# Patient Record
Sex: MALE | Race: WHITE | NOT HISPANIC OR LATINO | Employment: OTHER | URBAN - METROPOLITAN AREA
[De-identification: names, ages, dates, MRNs, and addresses within clinical notes are randomized per-mention and may not be internally consistent; named-entity substitution may affect disease eponyms.]

---

## 2017-01-03 ENCOUNTER — TELEPHONE (OUTPATIENT)
Dept: FAMILY MEDICINE CLINIC | Facility: CLINIC | Age: 52
End: 2017-01-03

## 2017-01-03 NOTE — TELEPHONE ENCOUNTER
Dr. Vail office called and left a VM stating that they have sent a phyiscal therapy encounter that needs signed and faxed to [phone]. She said this needs done asap. She did not leave a return number

## 2017-01-04 ENCOUNTER — OFFICE VISIT (OUTPATIENT)
Dept: FAMILY MEDICINE CLINIC | Facility: CLINIC | Age: 52
End: 2017-01-04

## 2017-01-04 VITALS — SYSTOLIC BLOOD PRESSURE: 120 MMHG | HEART RATE: 80 BPM | DIASTOLIC BLOOD PRESSURE: 80 MMHG

## 2017-01-04 DIAGNOSIS — R03.0 ELEVATED BLOOD PRESSURE (NOT HYPERTENSION): Primary | ICD-10-CM

## 2017-01-04 PROCEDURE — 99213 OFFICE O/P EST LOW 20 MIN: CPT | Performed by: FAMILY MEDICINE

## 2017-01-04 NOTE — MR AVS SNAPSHOT
Suresh Alcantar   2017 3:30 PM   Office Visit    Provider:  Naresh Gonzalez MD   Department:  [facility] PRIMARY CARE   Dept Phone:  [phone]                Your Full Care Plan              Your Updated Medication List          This list is accurate as of: 17  4:05 PM.  Always use your most recent med list.                baclofen 20 MG tablet   Commonly known as:  LIORESAL   Take 1 tablet by mouth 4 (Four) Times a Day.       loratadine 10 MG tablet   Commonly known as:  CLARITIN   TAKE 1 TABLET BY MOUTH DAILY               You Were Diagnosed With        Codes Comments    Elevated blood pressure (not hypertension)    -  Primary ICD-10-CM: R03.0  ICD-9-CM: 796.2       Instructions    Your blood pressure looks  Great!     Patient Instructions History      "[employer] Signup     [facility] [employer] allows you to send messages to your doctor, view your test results, renew your prescriptions, schedule appointments, and more. To sign up, go to [employer] and click on the Sign Up Now link in the New User? box. Enter your [employer] Activation Code exactly as it appears below along with the last four digits of your Social Security Number and your Date of Birth () to complete the sign-up process. If you do not sign up before the expiration date, you must request a new code.    [employer] Activation Code: [email or web address]  Expires: 2017  5:34 AM    If you have questions, you can email [email or web address] or call [phone] to talk to our [employer] staff. Remember, [employer] is NOT to be used for urgent needs. For medical emergencies, dial 911.               Other Info from Your Visit           Allergies     No Known Allergies      Reason for Visit     Hypertension           Vital Signs     Blood Pressure Pulse Smoking Status             120/80 80 Never Smoker         Problems and Diagnoses Noted     Elevated blood pressure (not hypertension)     -  Primary

## 2017-01-04 NOTE — PROGRESS NOTES
"Subjective   Suresh Alcantar is a 51 y.o. male here for 1MO. re-evaluation for hypertension.    History of Present Illness   At his last visit, he had elevated B/P. This was after a \"roll over\" accident in the waiting room that had upset him.  He is here with a caregiver today and feeling pretty good about life.  The following portions of the patient's history were reviewed and updated as appropriate: allergies, current medications, past medical history, past social history and problem list.    Review of Systems   Constitutional: Negative for activity change, chills, fatigue, fever and unexpected weight change.   HENT: Negative for congestion, sinus pressure, sore throat, tinnitus and trouble swallowing.    Eyes: Negative for discharge and visual disturbance.   Respiratory: Negative for cough, chest tightness, shortness of breath and wheezing.    Cardiovascular: Negative for chest pain, palpitations and leg swelling.   Gastrointestinal: Negative for abdominal distention, abdominal pain, constipation, diarrhea, nausea and vomiting.   Endocrine: Negative for cold intolerance, heat intolerance, polydipsia and polyuria.   Genitourinary: Negative for difficulty urinating, dysuria, frequency and urgency.   Musculoskeletal: Negative for arthralgias, gait problem and myalgias.   Skin: Negative for color change, rash and wound.   Neurological: Negative for dizziness, seizures, syncope, speech difficulty, weakness, light-headedness, numbness and headaches.   Hematological: Does not bruise/bleed easily.   Psychiatric/Behavioral: Negative for agitation, behavioral problems, confusion, hallucinations, self-injury, sleep disturbance and suicidal ideas. The patient is not nervous/anxious.        Objective   Physical Exam   HENT:   Head: Normocephalic and atraumatic.   Cardiovascular: Normal rate and regular rhythm.    Pulmonary/Chest: Effort normal and breath sounds normal.   Neurological: He is alert.   Psychiatric: He has a " normal mood and affect. His behavior is normal.   Nursing note and vitals reviewed.      Assessment/Plan   Suresh was seen today for hypertension.    Diagnoses and all orders for this visit:    Elevated blood pressure (not hypertension)    He has normal blood pressure in the office today I think the last elevated reading was due to an upset in the waiting room.

## 2017-02-23 ENCOUNTER — TELEPHONE (OUTPATIENT)
Dept: FAMILY MEDICINE CLINIC | Facility: CLINIC | Age: 52
End: 2017-02-23

## 2017-02-23 RX ORDER — BACLOFEN 20 MG/1
20 TABLET ORAL 4 TIMES DAILY
Qty: 120 TABLET | Refills: 2 | Status: SHIPPED | OUTPATIENT
Start: 2017-02-23 | End: 2017-03-02 | Stop reason: SDUPTHER

## 2017-02-23 NOTE — TELEPHONE ENCOUNTER
We received a RF request from the pharmacy for Tizanidine 2MG 1 PO BID and Ibuprofen 200MG 1 PO TID PRN. Ok to authorize RF ?

## 2017-02-23 NOTE — TELEPHONE ENCOUNTER
Would you look into this for me? I am fine with the ibuprofen but I do not see Tizanidine on his chart. We have refilled baclofen.

## 2017-03-02 RX ORDER — ACETAMINOPHEN 500 MG
500 TABLET ORAL EVERY 6 HOURS PRN
Qty: 90 TABLET | Refills: 3 | Status: SHIPPED | OUTPATIENT
Start: 2017-03-02

## 2017-03-02 RX ORDER — BACLOFEN 20 MG/1
20 TABLET ORAL 4 TIMES DAILY
Qty: 120 TABLET | Refills: 5 | Status: SHIPPED | OUTPATIENT
Start: 2017-03-02 | End: 2017-08-11 | Stop reason: SDUPTHER

## 2017-03-13 RX ORDER — LORATADINE 10 MG/1
10 TABLET ORAL DAILY
Qty: 90 TABLET | Refills: 0 | Status: SHIPPED | OUTPATIENT
Start: 2017-03-13 | End: 2017-04-21

## 2017-03-29 RX ORDER — HYDROCORTISONE 1 G/100G
CREAM TOPICAL
Qty: 28.35 G | Refills: 5 | Status: SHIPPED | OUTPATIENT
Start: 2017-03-29 | End: 2018-03-21 | Stop reason: SDUPTHER

## 2017-03-29 RX ORDER — IBUPROFEN 200 MG
TABLET ORAL
Qty: 30 TABLET | Refills: 5 | Status: SHIPPED | OUTPATIENT
Start: 2017-03-29 | End: 2018-03-21 | Stop reason: SDUPTHER

## 2017-03-29 RX ORDER — TIZANIDINE 2 MG/1
TABLET ORAL
Qty: 60 TABLET | Refills: 5 | OUTPATIENT
Start: 2017-03-29

## 2017-03-30 RX ORDER — OMEPRAZOLE 40 MG/1
CAPSULE, DELAYED RELEASE ORAL
Qty: 30 CAPSULE | Refills: 5 | Status: SHIPPED | OUTPATIENT
Start: 2017-03-30 | End: 2017-08-11 | Stop reason: SDUPTHER

## 2017-03-30 RX ORDER — MAG/ALUMINUM/SOD BICARB/ALGINC 20 MG-80MG
TABLET,CHEWABLE ORAL
Qty: 30 TABLET | Refills: 5 | Status: SHIPPED | OUTPATIENT
Start: 2017-03-30 | End: 2018-03-21 | Stop reason: SDUPTHER

## 2017-03-30 RX ORDER — TIZANIDINE 2 MG/1
TABLET ORAL
Qty: 60 TABLET | OUTPATIENT
Start: 2017-03-30

## 2017-03-30 RX ORDER — GUAIFENESIN 200 MG/1
TABLET ORAL
Qty: 30 TABLET | Refills: 5 | Status: SHIPPED | OUTPATIENT
Start: 2017-03-30 | End: 2019-01-15 | Stop reason: SDUPTHER

## 2017-03-30 RX ORDER — OCTISALATE, AVOBENZONE, HOMOSALATE, AND OCTOCRYLENE 29.4; 29.4; 49; 25.48 MG/ML; MG/ML; MG/ML; MG/ML
LOTION TOPICAL
Qty: 30 CAPSULE | Refills: 5 | Status: SHIPPED | OUTPATIENT
Start: 2017-03-30 | End: 2017-04-14 | Stop reason: SDUPTHER

## 2017-04-13 ENCOUNTER — TELEPHONE (OUTPATIENT)
Dept: FAMILY MEDICINE CLINIC | Facility: CLINIC | Age: 52
End: 2017-04-13

## 2017-04-13 NOTE — TELEPHONE ENCOUNTER
Carlos A's sister, Marsha, left a VM requesting a pick-up for urine cups to be checked for a UTI. This was recommended by therapy. Can we authorize Marsha to pick-up a urine cup to send off for culture ? Please advise.

## 2017-04-14 ENCOUNTER — CLINICAL SUPPORT (OUTPATIENT)
Dept: FAMILY MEDICINE CLINIC | Facility: CLINIC | Age: 52
End: 2017-04-14

## 2017-04-14 DIAGNOSIS — R80.9 PROTEINURIA: Primary | ICD-10-CM

## 2017-04-14 LAB
BILIRUB BLD-MCNC: NEGATIVE MG/DL
CLARITY, POC: CLEAR
COLOR UR: YELLOW
GLUCOSE UR STRIP-MCNC: NEGATIVE MG/DL
KETONES UR QL: NEGATIVE
LEUKOCYTE EST, POC: NEGATIVE
NITRITE UR-MCNC: NEGATIVE MG/ML
PH UR: 7.5 [PH] (ref 5–8)
PROT UR STRIP-MCNC: ABNORMAL MG/DL
RBC # UR STRIP: NEGATIVE /UL
SP GR UR: 1 (ref 1–1.03)
UROBILINOGEN UR QL: NORMAL

## 2017-04-14 PROCEDURE — 81002 URINALYSIS NONAUTO W/O SCOPE: CPT | Performed by: FAMILY MEDICINE

## 2017-04-14 RX ORDER — TIZANIDINE 2 MG/1
TABLET ORAL
Qty: 180 TABLET | Refills: 0 | Status: SHIPPED | OUTPATIENT
Start: 2017-04-14 | End: 2017-07-12 | Stop reason: SDUPTHER

## 2017-04-14 RX ORDER — OCTISALATE, AVOBENZONE, HOMOSALATE, AND OCTOCRYLENE 29.4; 29.4; 49; 25.48 MG/ML; MG/ML; MG/ML; MG/ML
LOTION TOPICAL
Qty: 90 CAPSULE | Refills: 0 | Status: SHIPPED | OUTPATIENT
Start: 2017-04-14 | End: 2017-07-12 | Stop reason: SDUPTHER

## 2017-04-16 LAB
BACTERIA UR CULT: NORMAL
BACTERIA UR CULT: NORMAL

## 2017-04-21 ENCOUNTER — TELEPHONE (OUTPATIENT)
Dept: FAMILY MEDICINE CLINIC | Facility: CLINIC | Age: 52
End: 2017-04-21

## 2017-04-21 RX ORDER — CETIRIZINE HYDROCHLORIDE 10 MG/1
10 TABLET ORAL DAILY
Qty: 30 TABLET | Refills: 5 | Status: SHIPPED | OUTPATIENT
Start: 2017-04-21 | End: 2017-09-19 | Stop reason: SDUPTHER

## 2017-04-21 NOTE — TELEPHONE ENCOUNTER
Sister called and would like Bryce allergy med from Claritin to Zyrtec.  New Rx sent to [facility] and DC order sent as well

## 2017-05-16 RX ORDER — DOCOSANOL 100 MG/G
CREAM TOPICAL
Qty: 1 TUBE | Refills: 3 | Status: SHIPPED | OUTPATIENT
Start: 2017-05-16 | End: 2019-05-09

## 2017-06-14 RX ORDER — DOCUSATE SODIUM 100 MG/1
CAPSULE, LIQUID FILLED ORAL
Qty: 30 CAPSULE | Refills: 5 | Status: SHIPPED | OUTPATIENT
Start: 2017-06-14 | End: 2017-12-05 | Stop reason: SDUPTHER

## 2017-07-12 RX ORDER — TIZANIDINE 2 MG/1
TABLET ORAL
Qty: 180 TABLET | Refills: 0 | Status: SHIPPED | OUTPATIENT
Start: 2017-07-12 | End: 2017-10-12 | Stop reason: SDUPTHER

## 2017-07-12 RX ORDER — OCTISALATE, AVOBENZONE, HOMOSALATE, AND OCTOCRYLENE 29.4; 29.4; 49; 25.48 MG/ML; MG/ML; MG/ML; MG/ML
LOTION TOPICAL
Qty: 90 CAPSULE | Refills: 0 | Status: SHIPPED | OUTPATIENT
Start: 2017-07-12 | End: 2017-10-12 | Stop reason: SDUPTHER

## 2017-08-14 RX ORDER — MULTIVIT-MIN/FOLIC/VIT K/LYCOP 400-300MCG
TABLET ORAL
Qty: 30 TABLET | Refills: 12 | Status: SHIPPED | OUTPATIENT
Start: 2017-08-14 | End: 2018-08-07 | Stop reason: SDUPTHER

## 2017-08-14 RX ORDER — OMEPRAZOLE 40 MG/1
CAPSULE, DELAYED RELEASE ORAL
Qty: 30 CAPSULE | Refills: 12 | Status: SHIPPED | OUTPATIENT
Start: 2017-08-14 | End: 2018-01-19 | Stop reason: SDUPTHER

## 2017-08-14 RX ORDER — BACLOFEN 20 MG/1
TABLET ORAL
Qty: 120 TABLET | Refills: 1 | Status: SHIPPED | OUTPATIENT
Start: 2017-08-14 | End: 2017-10-12 | Stop reason: SDUPTHER

## 2017-09-19 RX ORDER — CETIRIZINE HYDROCHLORIDE 10 MG/1
TABLET ORAL
Qty: 30 TABLET | Refills: 5 | Status: SHIPPED | OUTPATIENT
Start: 2017-09-19 | End: 2018-03-21 | Stop reason: SDUPTHER

## 2017-10-13 RX ORDER — OCTISALATE, AVOBENZONE, HOMOSALATE, AND OCTOCRYLENE 29.4; 29.4; 49; 25.48 MG/ML; MG/ML; MG/ML; MG/ML
LOTION TOPICAL
Qty: 90 CAPSULE | Refills: 3 | Status: SHIPPED | OUTPATIENT
Start: 2017-10-13 | End: 2018-09-25 | Stop reason: SDUPTHER

## 2017-10-13 RX ORDER — BACLOFEN 20 MG/1
TABLET ORAL
Qty: 120 TABLET | Refills: 3 | Status: SHIPPED | OUTPATIENT
Start: 2017-10-13 | End: 2018-02-06 | Stop reason: SDUPTHER

## 2017-10-13 RX ORDER — TIZANIDINE 2 MG/1
TABLET ORAL
Qty: 180 TABLET | Refills: 3 | Status: SHIPPED | OUTPATIENT
Start: 2017-10-13 | End: 2018-09-25 | Stop reason: SDUPTHER

## 2017-12-05 RX ORDER — DOCUSATE SODIUM 100 MG/1
CAPSULE ORAL
Qty: 30 CAPSULE | Refills: 11 | Status: SHIPPED | OUTPATIENT
Start: 2017-12-05 | End: 2018-11-26 | Stop reason: SDUPTHER

## 2017-12-13 ENCOUNTER — OFFICE VISIT (OUTPATIENT)
Dept: FAMILY MEDICINE CLINIC | Facility: CLINIC | Age: 52
End: 2017-12-13

## 2017-12-13 VITALS
HEART RATE: 91 BPM | SYSTOLIC BLOOD PRESSURE: 126 MMHG | DIASTOLIC BLOOD PRESSURE: 86 MMHG | OXYGEN SATURATION: 98 % | TEMPERATURE: 98.4 F

## 2017-12-13 DIAGNOSIS — J06.9 ACUTE URI: Primary | ICD-10-CM

## 2017-12-13 PROCEDURE — 99213 OFFICE O/P EST LOW 20 MIN: CPT | Performed by: FAMILY MEDICINE

## 2017-12-13 NOTE — PROGRESS NOTES
Subjective   Suresh Alcantar is a 52 y.o. male.     History of Present Illness   Suresh is here today with a cough and runny nose. The symptoms started on Friday, but he didn't start taking any Mucinex until Monday.     The following portions of the patient's history were reviewed and updated as appropriate: allergies, current medications, past medical history, past social history and problem list.    Review of Systems   Genitourinary: Positive for frequency.   All other systems reviewed and are negative.      Objective   Physical Exam   Constitutional: He is oriented to person, place, and time. He appears well-developed and well-nourished. No distress.   HENT:   Head: Normocephalic and atraumatic.   Right Ear: External ear normal.   Left Ear: External ear normal.   Nose: Nose normal.   Mouth/Throat: Oropharynx is clear and moist. No oropharyngeal exudate.   Eyes: Conjunctivae and EOM are normal. Pupils are equal, round, and reactive to light.   Neck: Normal range of motion.   Cardiovascular: Normal rate and regular rhythm.    Pulmonary/Chest: Effort normal and breath sounds normal. No stridor. No respiratory distress. He has no wheezes.   Lymphadenopathy:     He has no cervical adenopathy.   Neurological: He is alert and oriented to person, place, and time.   Skin: Skin is warm and dry. No rash noted.   Nursing note and vitals reviewed.      Assessment/Plan   Suresh was seen today for nasal congestion and sore throat.    Diagnoses and all orders for this visit:    Acute URI    Patient Instructions   I have advised supportive care with Mucinex and lots of fluids, Tylenol as needed. Caregiver will call if he does not improve or gets worse.

## 2017-12-15 ENCOUNTER — CLINICAL SUPPORT (OUTPATIENT)
Dept: FAMILY MEDICINE CLINIC | Facility: CLINIC | Age: 52
End: 2017-12-15

## 2017-12-15 DIAGNOSIS — R35.0 URINARY FREQUENCY: Primary | ICD-10-CM

## 2017-12-15 DIAGNOSIS — R31.9 HEMATURIA, UNSPECIFIED TYPE: ICD-10-CM

## 2017-12-15 DIAGNOSIS — R31.9 HEMATURIA, UNSPECIFIED TYPE: Primary | ICD-10-CM

## 2017-12-15 LAB
BILIRUB BLD-MCNC: NEGATIVE MG/DL
CLARITY, POC: CLEAR
COLOR UR: ABNORMAL
GLUCOSE UR STRIP-MCNC: NEGATIVE MG/DL
KETONES UR QL: NEGATIVE
LEUKOCYTE EST, POC: NEGATIVE
NITRITE UR-MCNC: NEGATIVE MG/ML
PH UR: 9 [PH] (ref 5–8)
PROT UR STRIP-MCNC: ABNORMAL MG/DL
RBC # UR STRIP: ABNORMAL /UL
SP GR UR: 1 (ref 1–1.03)
UROBILINOGEN UR QL: NORMAL

## 2017-12-15 PROCEDURE — 81002 URINALYSIS NONAUTO W/O SCOPE: CPT | Performed by: FAMILY MEDICINE

## 2017-12-15 RX ORDER — SULFAMETHOXAZOLE AND TRIMETHOPRIM 800; 160 MG/1; MG/1
1 TABLET ORAL 2 TIMES DAILY
Qty: 6 TABLET | Refills: 0 | Status: SHIPPED | OUTPATIENT
Start: 2017-12-15 | End: 2019-05-09

## 2017-12-16 NOTE — PATIENT INSTRUCTIONS
I have advised supportive care with Mucinex and lots of fluids, Tylenol as needed. Caregiver will call if he does not improve or gets worse.

## 2017-12-21 LAB
BACTERIA UR CULT: ABNORMAL
BACTERIA UR CULT: ABNORMAL
OTHER ANTIBIOTIC SUSC ISLT: ABNORMAL

## 2018-01-15 DIAGNOSIS — R31.9 HEMATURIA, UNSPECIFIED TYPE: Primary | ICD-10-CM

## 2018-01-15 LAB
APPEARANCE UR: CLEAR
BACTERIA #/AREA URNS HPF: NORMAL /HPF
BILIRUB UR QL STRIP: NEGATIVE
COLOR UR: YELLOW
EPI CELLS #/AREA URNS HPF: NORMAL /HPF
GLUCOSE UR QL: (no result)
HGB UR QL STRIP: NEGATIVE
KETONES UR QL STRIP: NEGATIVE
LEUKOCYTE ESTERASE UR QL STRIP: NEGATIVE
NITRITE UR QL STRIP: NEGATIVE
PH UR STRIP: 7 [PH] (ref 5–8)
PROT UR QL STRIP: NEGATIVE
RBC #/AREA URNS HPF: NORMAL /HPF
SP GR UR: 1.01 (ref 1–1.03)
UROBILINOGEN UR STRIP-MCNC: (no result) MG/DL
WBC #/AREA URNS HPF: NORMAL /HPF

## 2018-01-17 LAB
BACTERIA UR CULT: NORMAL
BACTERIA UR CULT: NORMAL

## 2018-01-19 RX ORDER — OMEPRAZOLE 40 MG/1
CAPSULE, DELAYED RELEASE ORAL
Qty: 30 CAPSULE | Refills: 5 | Status: SHIPPED | OUTPATIENT
Start: 2018-01-19 | End: 2018-08-07 | Stop reason: SDUPTHER

## 2018-02-06 RX ORDER — BACLOFEN 20 MG/1
TABLET ORAL
Qty: 120 TABLET | Refills: 3 | Status: SHIPPED | OUTPATIENT
Start: 2018-02-06 | End: 2018-06-01 | Stop reason: SDUPTHER

## 2018-03-21 RX ORDER — IBUPROFEN 200 MG
TABLET ORAL
Qty: 30 TABLET | Refills: 6 | Status: SHIPPED | OUTPATIENT
Start: 2018-03-21 | End: 2021-02-04 | Stop reason: SDUPTHER

## 2018-03-21 RX ORDER — HYDROCORTISONE 1 G/100G
CREAM TOPICAL
Qty: 28.4 G | Refills: 6 | Status: SHIPPED | OUTPATIENT
Start: 2018-03-21 | End: 2019-03-15 | Stop reason: SDUPTHER

## 2018-03-21 RX ORDER — MAG/ALUMINUM/SOD BICARB/ALGINC 20 MG-80MG
TABLET,CHEWABLE ORAL
Qty: 30 TABLET | Refills: 6 | Status: SHIPPED | OUTPATIENT
Start: 2018-03-21 | End: 2019-03-15 | Stop reason: SDUPTHER

## 2018-03-21 RX ORDER — CETIRIZINE HYDROCHLORIDE 10 MG/1
TABLET ORAL
Qty: 30 TABLET | Refills: 6 | Status: SHIPPED | OUTPATIENT
Start: 2018-03-21 | End: 2018-10-19 | Stop reason: SDUPTHER

## 2018-03-26 ENCOUNTER — OFFICE VISIT (OUTPATIENT)
Dept: FAMILY MEDICINE CLINIC | Facility: CLINIC | Age: 53
End: 2018-03-26

## 2018-03-26 VITALS — HEART RATE: 154 BPM | RESPIRATION RATE: 16 BRPM | DIASTOLIC BLOOD PRESSURE: 88 MMHG | SYSTOLIC BLOOD PRESSURE: 128 MMHG

## 2018-03-26 DIAGNOSIS — Z12.5 SCREENING FOR PROSTATE CANCER: ICD-10-CM

## 2018-03-26 DIAGNOSIS — Z00.00 PHYSICAL EXAM: Primary | ICD-10-CM

## 2018-03-26 DIAGNOSIS — G80.1 SPASTIC DIPLEGIC CEREBRAL PALSY (HCC): ICD-10-CM

## 2018-03-26 PROCEDURE — 99396 PREV VISIT EST AGE 40-64: CPT | Performed by: NURSE PRACTITIONER

## 2018-03-26 NOTE — PROGRESS NOTES
Suresh Alcantar is a 53 y.o. male. He is accompanied by a caretaker from [facility] .  Here for his annual wellness exam for [facility]  Is doing well has no complaints.  H/O CP  H/O constipation and seasonal allergies and spasticity, is stable on his meds  Has daily PT, is wheel chair bound is no longer ambulatory      Assessment/Plan   Problem List Items Addressed This Visit     None      Visit Diagnoses     Physical exam    -  Primary    Relevant Orders    Comprehensive Metabolic Panel (Completed)    Lipid Panel With / Chol / HDL Ratio (Completed)    QuantiFERON TB Gold    PSA SCREENING    Screening for prostate cancer        Relevant Orders    PSA SCREENING    Spastic diplegic cerebral palsy          No problems or complaints       No Follow-up on file.  There are no Patient Instructions on file for this visit.    Chief Complaint   Patient presents with   • Annual Exam     Medicare     Social History   Substance Use Topics   • Smoking status: Never Smoker   • Smokeless tobacco: Never Used   • Alcohol use No       History of Present Illness     The following portions of the patient's history were reviewed and updated as appropriate:PMHroutine: Social history , Allergies, Current Medications, Active Problem List and Health Maintenance    Review of Systems   Constitutional: Negative for activity change and appetite change.   Respiratory: Negative for cough and shortness of breath.        Objective   Vitals:    03/26/18 1447   BP: 128/88   Pulse: (!) 154   Resp: 16     There is no height or weight on file to calculate BMI.  Physical Exam   Constitutional: He appears well-developed and well-nourished. No distress.   HENT:   Head: Normocephalic and atraumatic.   Right Ear: External ear normal.   Left Ear: External ear normal.   Mouth/Throat: Oropharynx is clear and moist.   Eyes: EOM are normal.   Neck: Neck supple.   Cardiovascular: Normal rate and regular rhythm.    Pulmonary/Chest: Effort normal and breath sounds  normal.   Musculoskeletal: Normal range of motion.   Spacticity upper and lower extremities.   Neurological: He is alert.   Skin: Skin is warm.   Nursing note and vitals reviewed.    Reviewed Data:  Office Visit on 03/26/2018   Component Date Value Ref Range Status   • Glucose 03/27/2018 104* 65 - 99 mg/dL Final   • BUN 03/27/2018 13  6 - 24 mg/dL Final   • Creatinine 03/27/2018 0.67* 0.76 - 1.27 mg/dL Final   • eGFR Non African Am 03/27/2018 110  >59 mL/min/1.73 Final   • eGFR African Am 03/27/2018 127  >59 mL/min/1.73 Final   • BUN/Creatinine Ratio 03/27/2018 19  9 - 20 Final   • Sodium 03/27/2018 143  134 - 144 mmol/L Final   • Potassium 03/27/2018 4.8  3.5 - 5.2 mmol/L Final   • Chloride 03/27/2018 101  96 - 106 mmol/L Final   • Total CO2 03/27/2018 27  18 - 29 mmol/L Final   • Calcium 03/27/2018 9.4  8.7 - 10.2 mg/dL Final   • Total Protein 03/27/2018 7.3  6.0 - 8.5 g/dL Final   • Albumin 03/27/2018 4.4  3.5 - 5.5 g/dL Final   • Globulin 03/27/2018 2.9  1.5 - 4.5 g/dL Final   • A/G Ratio 03/27/2018 1.5  1.2 - 2.2 Final   • Total Bilirubin 03/27/2018 0.2  0.0 - 1.2 mg/dL Final   • Alkaline Phosphatase 03/27/2018 81  39 - 117 IU/L Final   • AST (SGOT) 03/27/2018 20  0 - 40 IU/L Final   • ALT (SGPT) 03/27/2018 11  0 - 44 IU/L Final   • Total Cholesterol 03/27/2018 155  100 - 199 mg/dL Final   • Triglycerides 03/27/2018 44  0 - 149 mg/dL Final   • HDL Cholesterol 03/27/2018 66  >39 mg/dL Final   • VLDL Cholesterol 03/27/2018 9  5 - 40 mg/dL Final   • LDL Cholesterol  03/27/2018 80  0 - 99 mg/dL Final   • Chol/HDL Ratio 03/27/2018 2.3  0.0 - 5.0 ratio units Final    Comment:                                   T. Chol/HDL Ratio                                              Men  Women                                1/2 Avg.Risk  3.4    3.3                                    Avg.Risk  5.0    4.4                                 2X Avg.Risk  9.6    7.1                                 3X Avg.Risk 23.4   11.0     • PSA  03/27/2018 1.4  0.0 - 4.0 ng/mL Final    Comment: Roche ECLIA methodology.  According to the American Urological Association, Serum PSA should  decrease and remain at undetectable levels after radical  prostatectomy. The AUA defines biochemical recurrence as an initial  PSA value 0.2 ng/mL or greater followed by a subsequent confirmatory  PSA value 0.2 ng/mL or greater.  Values obtained with different assay methods or kits cannot be used  interchangeably. Results cannot be interpreted as absolute evidence  of the presence or absence of malignant disease.

## 2018-03-27 PROBLEM — G80.1 SPASTIC DIPLEGIC CEREBRAL PALSY: Chronic | Status: ACTIVE | Noted: 2018-03-27

## 2018-03-27 LAB
ALBUMIN SERPL-MCNC: 4.4 G/DL (ref 3.5–5.5)
ALBUMIN/GLOB SERPL: 1.5 {RATIO} (ref 1.2–2.2)
ALP SERPL-CCNC: 81 IU/L (ref 39–117)
ALT SERPL-CCNC: 11 IU/L (ref 0–44)
AST SERPL-CCNC: 20 IU/L (ref 0–40)
BILIRUB SERPL-MCNC: 0.2 MG/DL (ref 0–1.2)
BUN SERPL-MCNC: 13 MG/DL (ref 6–24)
BUN/CREAT SERPL: 19 (ref 9–20)
CALCIUM SERPL-MCNC: 9.4 MG/DL (ref 8.7–10.2)
CHLORIDE SERPL-SCNC: 101 MMOL/L (ref 96–106)
CHOLEST SERPL-MCNC: 155 MG/DL (ref 100–199)
CHOLEST/HDLC SERPL: 2.3 RATIO UNITS (ref 0–5)
CO2 SERPL-SCNC: 27 MMOL/L (ref 18–29)
CREAT SERPL-MCNC: 0.67 MG/DL (ref 0.76–1.27)
GFR SERPLBLD CREATININE-BSD FMLA CKD-EPI: 110 ML/MIN/1.73
GFR SERPLBLD CREATININE-BSD FMLA CKD-EPI: 127 ML/MIN/1.73
GLOBULIN SER CALC-MCNC: 2.9 G/DL (ref 1.5–4.5)
GLUCOSE SERPL-MCNC: 104 MG/DL (ref 65–99)
HDLC SERPL-MCNC: 66 MG/DL
LDLC SERPL CALC-MCNC: 80 MG/DL (ref 0–99)
POTASSIUM SERPL-SCNC: 4.8 MMOL/L (ref 3.5–5.2)
PROT SERPL-MCNC: 7.3 G/DL (ref 6–8.5)
PSA SERPL-MCNC: 1.4 NG/ML (ref 0–4)
SODIUM SERPL-SCNC: 143 MMOL/L (ref 134–144)
TRIGL SERPL-MCNC: 44 MG/DL (ref 0–149)
VLDLC SERPL CALC-MCNC: 9 MG/DL (ref 5–40)

## 2018-03-29 LAB
ANNOTATION COMMENT IMP: NORMAL
GAMMA INTERFERON BACKGROUND BLD IA-ACNC: 0.02 IU/ML
M TB IFN-G BLD-IMP: NEGATIVE
M TB IFN-G CD4+ BCKGRND COR BLD-ACNC: 0.06 IU/ML
M TB IFN-G CD4+ T-CELLS BLD-ACNC: 0.08 IU/ML
MITOGEN IGNF BLD-ACNC: >10 IU/ML
QUANTIFERON INCUBATION: NORMAL
SERVICE CMNT-IMP: NORMAL

## 2018-06-01 RX ORDER — BACLOFEN 20 MG/1
TABLET ORAL
Qty: 120 TABLET | Refills: 5 | Status: SHIPPED | OUTPATIENT
Start: 2018-06-01 | End: 2018-07-17 | Stop reason: SDUPTHER

## 2018-06-13 ENCOUNTER — TELEPHONE (OUTPATIENT)
Dept: FAMILY MEDICINE CLINIC | Facility: CLINIC | Age: 53
End: 2018-06-13

## 2018-06-13 DIAGNOSIS — R29.6 FREQUENT FALLS: Primary | ICD-10-CM

## 2018-06-13 DIAGNOSIS — G80.0 SPASTIC QUADRIPLEGIC CEREBRAL PALSY (HCC): ICD-10-CM

## 2018-06-13 DIAGNOSIS — R29.6 FALLS FREQUENTLY: Primary | ICD-10-CM

## 2018-06-13 NOTE — TELEPHONE ENCOUNTER
He is with [facility] and Marsha called to say toilet in his apartment is messed up and she complained when he first moved in, but [facility] asked her to work with them.    He has fallen twice and [facility] is now requesting PT/OT to eval him to be sure it's not just him falling before they put in order to have toilet repaired.    Says she spoke with Erica at [facility] and she prefers for her to work with him.    Marsha's number is [phone].

## 2018-06-19 ENCOUNTER — TELEPHONE (OUTPATIENT)
Dept: FAMILY MEDICINE CLINIC | Facility: CLINIC | Age: 53
End: 2018-06-19

## 2018-06-19 NOTE — TELEPHONE ENCOUNTER
Erica with [facility] called. She said there's no need to return her call! Just wanted to update you that they did EVAL ONLY regarding  his toilet.     She's recommending that toilet be moved because patient is boxed in, has to place his hand in toilet to get adjusted,etc and just too many factors that are not good for the patient.     She is sending recommendation over and the facility is willing to do the necessary updates to accommodate the patient.    If you feel you need to speak with her, she can be reached at [phone].

## 2018-07-17 RX ORDER — BACLOFEN 20 MG/1
TABLET ORAL
Qty: 120 TABLET | Refills: 1 | Status: SHIPPED | OUTPATIENT
Start: 2018-07-17 | End: 2018-09-18 | Stop reason: SDUPTHER

## 2018-07-26 ENCOUNTER — TELEPHONE (OUTPATIENT)
Dept: FAMILY MEDICINE CLINIC | Facility: CLINIC | Age: 53
End: 2018-07-26

## 2018-07-26 NOTE — TELEPHONE ENCOUNTER
Marsha Almeida left [facility] that pt has had diarrhea for 3 days. Says he is still getting out & wanting to do things, but she's just concerned.    Asking if he needs an appt to see you, can a rx for imodium be called to pharmacy, or do you suggest he do something else?    Marsha says it's fine to call her with suggestion. Thanks!

## 2018-08-08 RX ORDER — OMEPRAZOLE 40 MG/1
CAPSULE, DELAYED RELEASE ORAL
Qty: 30 CAPSULE | Refills: 0 | Status: SHIPPED | OUTPATIENT
Start: 2018-08-08 | End: 2018-08-10 | Stop reason: SDUPTHER

## 2018-08-13 RX ORDER — OMEPRAZOLE 40 MG/1
CAPSULE, DELAYED RELEASE ORAL
Qty: 28 CAPSULE | Refills: 5 | Status: SHIPPED | OUTPATIENT
Start: 2018-08-13 | End: 2019-02-05 | Stop reason: SDUPTHER

## 2018-09-18 RX ORDER — BACLOFEN 20 MG/1
TABLET ORAL
Qty: 120 TABLET | Refills: 11 | Status: SHIPPED | OUTPATIENT
Start: 2018-09-18 | End: 2019-09-09 | Stop reason: SDUPTHER

## 2018-09-26 RX ORDER — TIZANIDINE 2 MG/1
TABLET ORAL
Qty: 56 TABLET | Refills: 11 | Status: SHIPPED | OUTPATIENT
Start: 2018-09-26 | End: 2021-01-29 | Stop reason: SDUPTHER

## 2018-09-26 RX ORDER — MELATONIN 10 MG
TABLET, SUBLINGUAL SUBLINGUAL
Qty: 28 CAPSULE | Refills: 11 | Status: SHIPPED | OUTPATIENT
Start: 2018-09-26 | End: 2019-09-17 | Stop reason: SDUPTHER

## 2018-10-22 RX ORDER — CETIRIZINE HYDROCHLORIDE 10 MG/1
TABLET ORAL
Qty: 30 TABLET | Refills: 11 | Status: SHIPPED | OUTPATIENT
Start: 2018-10-22 | End: 2019-10-04 | Stop reason: SDUPTHER

## 2018-11-20 RX ORDER — IBUPROFEN 200 MG
TABLET ORAL
Qty: 30 TABLET | Refills: 5 | Status: SHIPPED | OUTPATIENT
Start: 2018-11-20 | End: 2019-03-15 | Stop reason: SDUPTHER

## 2018-11-27 RX ORDER — DOCUSATE SODIUM 100 MG/1
CAPSULE, LIQUID FILLED ORAL
Qty: 30 CAPSULE | Refills: 11 | Status: SHIPPED | OUTPATIENT
Start: 2018-11-27 | End: 2019-10-31 | Stop reason: SDUPTHER

## 2019-01-15 RX ORDER — GUAIFENESIN 200 MG/1
200 TABLET ORAL EVERY 6 HOURS PRN
Qty: 30 TABLET | Refills: 5 | Status: SHIPPED | OUTPATIENT
Start: 2019-01-15 | End: 2020-01-31

## 2019-02-05 RX ORDER — OMEPRAZOLE 40 MG/1
CAPSULE, DELAYED RELEASE ORAL
Qty: 30 CAPSULE | Refills: 0 | Status: SHIPPED | OUTPATIENT
Start: 2019-02-05 | End: 2019-02-15 | Stop reason: SDUPTHER

## 2019-02-15 RX ORDER — OMEPRAZOLE 40 MG/1
40 CAPSULE, DELAYED RELEASE ORAL
Qty: 30 CAPSULE | Refills: 11 | Status: SHIPPED | OUTPATIENT
Start: 2019-02-15 | End: 2020-01-31

## 2019-03-15 RX ORDER — CALCIUM CARBONATE 200(500)MG
1 TABLET,CHEWABLE ORAL 3 TIMES DAILY
Qty: 30 TABLET | Refills: 11 | Status: SHIPPED | OUTPATIENT
Start: 2019-03-15 | End: 2019-03-20 | Stop reason: SDUPTHER

## 2019-03-15 RX ORDER — IBUPROFEN 200 MG
200 TABLET ORAL EVERY 8 HOURS PRN
Qty: 30 TABLET | Refills: 11 | Status: SHIPPED | OUTPATIENT
Start: 2019-03-15 | End: 2021-01-27 | Stop reason: SDUPTHER

## 2019-03-20 RX ORDER — CALCIUM CARBONATE 500 MG/1
TABLET, CHEWABLE ORAL
Qty: 30 TABLET | Refills: 11 | Status: SHIPPED | OUTPATIENT
Start: 2019-03-20 | End: 2019-03-27 | Stop reason: SDUPTHER

## 2019-03-27 RX ORDER — CALCIUM CARBONATE 200(500)MG
1 TABLET,CHEWABLE ORAL 3 TIMES DAILY PRN
Qty: 30 TABLET | Refills: 11 | Status: SHIPPED | OUTPATIENT
Start: 2019-03-27 | End: 2019-05-09

## 2019-04-04 DIAGNOSIS — R53.83 FATIGUE, UNSPECIFIED TYPE: ICD-10-CM

## 2019-04-04 DIAGNOSIS — Z13.6 SCREENING FOR ISCHEMIC HEART DISEASE: ICD-10-CM

## 2019-04-04 DIAGNOSIS — Z13.1 SCREENING FOR DIABETES MELLITUS: Primary | ICD-10-CM

## 2019-04-05 LAB
ERYTHROCYTE [DISTWIDTH] IN BLOOD BY AUTOMATED COUNT: 14.1 % (ref 12.3–15.4)
HCT VFR BLD AUTO: 44.3 % (ref 37.5–51)
HGB BLD-MCNC: 13.5 G/DL (ref 13–17.7)
MCH RBC QN AUTO: 29 PG (ref 26.6–33)
MCHC RBC AUTO-ENTMCNC: 30.5 G/DL (ref 31.5–35.7)
MCV RBC AUTO: 95.3 FL (ref 79–97)
PLATELET # BLD AUTO: 278 10*3/MM3 (ref 140–450)
RBC # BLD AUTO: 4.65 10*6/MM3 (ref 4.14–5.8)
WBC # BLD AUTO: 4.61 10*3/MM3 (ref 3.4–10.8)

## 2019-04-06 LAB
ALBUMIN SERPL-MCNC: 4.8 G/DL (ref 3.5–5.2)
ALBUMIN/GLOB SERPL: 1.8 G/DL
ALP SERPL-CCNC: 90 U/L (ref 39–117)
ALT SERPL-CCNC: 17 U/L (ref 1–41)
AST SERPL-CCNC: 20 U/L (ref 1–40)
BILIRUB SERPL-MCNC: 0.5 MG/DL (ref 0.2–1.2)
BUN SERPL-MCNC: 11 MG/DL (ref 6–20)
BUN/CREAT SERPL: 14.1 (ref 7–25)
CALCIUM SERPL-MCNC: 10.2 MG/DL (ref 8.6–10.5)
CHLORIDE SERPL-SCNC: 102 MMOL/L (ref 98–107)
CHOLEST SERPL-MCNC: 170 MG/DL (ref 0–200)
CO2 SERPL-SCNC: 30 MMOL/L (ref 22–29)
CREAT SERPL-MCNC: 0.78 MG/DL (ref 0.76–1.27)
GLOBULIN SER CALC-MCNC: 2.7 GM/DL
GLUCOSE SERPL-MCNC: 95 MG/DL (ref 65–99)
HDLC SERPL-MCNC: 81 MG/DL (ref 40–60)
LDLC SERPL CALC-MCNC: 79 MG/DL (ref 0–100)
LDLC/HDLC SERPL: 0.98 {RATIO}
POTASSIUM SERPL-SCNC: 5.1 MMOL/L (ref 3.5–5.2)
PROT SERPL-MCNC: 7.5 G/DL (ref 6–8.5)
SODIUM SERPL-SCNC: 141 MMOL/L (ref 136–145)
TRIGL SERPL-MCNC: 49 MG/DL (ref 0–150)
VLDLC SERPL CALC-MCNC: 9.8 MG/DL

## 2019-05-09 ENCOUNTER — OFFICE VISIT (OUTPATIENT)
Dept: FAMILY MEDICINE CLINIC | Facility: CLINIC | Age: 54
End: 2019-05-09

## 2019-05-09 VITALS
OXYGEN SATURATION: 95 % | RESPIRATION RATE: 18 BRPM | SYSTOLIC BLOOD PRESSURE: 110 MMHG | DIASTOLIC BLOOD PRESSURE: 74 MMHG | HEART RATE: 92 BPM

## 2019-05-09 DIAGNOSIS — G80.1 SPASTIC DIPLEGIC CEREBRAL PALSY (HCC): ICD-10-CM

## 2019-05-09 DIAGNOSIS — Z00.00 ROUTINE GENERAL MEDICAL EXAMINATION AT A HEALTH CARE FACILITY: Primary | ICD-10-CM

## 2019-05-09 PROCEDURE — G0439 PPPS, SUBSEQ VISIT: HCPCS | Performed by: FAMILY MEDICINE

## 2019-05-09 RX ORDER — OXYBUTYNIN CHLORIDE 5 MG/1
5 TABLET ORAL DAILY
COMMUNITY
End: 2019-05-21 | Stop reason: SDUPTHER

## 2019-05-09 NOTE — PATIENT INSTRUCTIONS
Medicare Wellness  Personal Prevention Plan of Service     Date of Office Visit:  2019  Encounter Provider:  Naresh Gonzalez MD  Place of Service:  [facility] PRIMARY CARE  Patient Name: Suresh Alcantar  :  1965    As part of the Medicare Wellness portion of your visit today, we are providing you with this personalized preventive plan of services (PPPS). This plan is based upon recommendations of the [place] Preventive Services Task Force (USPSTF) and the Advisory Committee on Immunization Practices (ACIP).    This lists the preventive care services that should be considered, and provides dates of when you are due. Items listed as completed are up-to-date and do not require any further intervention.    Health Maintenance   Topic Date Due   • ZOSTER VACCINE (1 of 2) 2015   • INFLUENZA VACCINE  2019   • LIPID PANEL  2020   • MEDICARE ANNUAL WELLNESS  2020   • COLONOSCOPY  2026   • TDAP/TD VACCINES (2 - Td) 2026   • HEPATITIS C SCREENING  Completed       No orders of the defined types were placed in this encounter.      Return in about 1 year (around 2020) for Annual physical.

## 2019-05-09 NOTE — PROGRESS NOTES
Medicare Subsequent Wellness Visit  Subjective   History of Present Illness    Suresh Alcantar is a 54 y.o. male who presents for an Medicare Wellness Visit. In addition, we addressed the following health issues:  He has a hx of CP and is in a motorized chair. He is here today with a friend, Carmelo Mandel.  Carlos A is living at [facility] goes to [place] and has a very active social life. He medical issues are stable. He is clean, skin is in good condition and he is very happy.    PMH, PSH, SocHx, FamHx, Allergies, and Medications: Reviewed and updated in the history section of chart.  Family History   Problem Relation Age of Onset   • Diabetes Mother    • Thyroid disease Mother    • Hypertension Mother    • Diabetes Father    • Diabetes Sister    • Uterine cancer Sister    • Throat cancer Sister    • Rectal cancer Sister    • Rheum arthritis Sister    • Hypertension Sister    • Diabetes Brother    • Hypertension Brother    • Cancer Paternal Aunt         Bladder.   • Colon cancer Paternal Uncle    • Heart disease Maternal Grandmother    • Heart attack Maternal Grandmother        Social History     Social History Narrative   • Not on file       No Known Allergies    Outpatient Medications Prior to Visit   Medication Sig Dispense Refill   • oxybutynin (DITROPAN) 5 MG tablet Take 5 mg by mouth Daily.     • acetaminophen (TYLENOL) 500 MG tablet Take 1 tablet by mouth Every 6 (Six) Hours As Needed for mild pain (1-3). 90 tablet 3   • baclofen (LIORESAL) 20 MG tablet TAKE 1 TABLET BY MOUTH 4 TIMES DAILY 120 tablet 11   • cetirizine (zyrTEC) 10 MG tablet TAKE 1 TABLET BY MOUTH ONCE DAILY 30 tablet 11   •  MG capsule TAKE 1 CAPSULE BY MOUTH AT BEDTIME 30 capsule 11   • guaiFENesin 200 MG tablet Take 1 tablet by mouth Every 6 (Six) Hours As Needed for Cough. 30 tablet 5   • Hydrocortisone-Aloe Vera (GNP HYDROCORTISONE/ALOE) 1 % cream Apply 1 application topically to the appropriate area as directed 4 (Four) Times a  Day As Needed (skin irritation). 28.4 g 6   • ibuprofen (ADVIL,MOTRIN) 200 MG tablet TAKE TWO TABLETS BY MOUTH THREE TIMES DAILY AS NEEDED FOR PAIN 30 tablet 6   • ibuprofen (ADVIL,MOTRIN) 200 MG tablet Take 1 tablet by mouth Every 8 (Eight) Hours As Needed for Mild Pain . 30 tablet 11   • omeprazole (priLOSEC) 40 MG capsule Take 1 capsule by mouth every night at bedtime. 30 capsule 11   • Pediatric Multiple Vit-C-FA (MULTIVITAMIN WITH C  & FOLIC ACID) with C & FA chewable tablet chewable tablet CHEW 1 TABLET ORALLY ONCE DAILY 28 tablet 11   • Probiotic Product (ADVANCED PROBIOTIC) capsule TAKE 1 CAPSULE BY MOUTH DAILY 28 capsule 11   • tiZANidine (ZANAFLEX) 2 MG tablet TAKE 1 TABLET BY MOUTH TWICE DAILY 56 tablet 11   • bismuth subsalicylate (BISMATROL) 262 MG/15ML suspension Take 15 mL by mouth Every 8 (Eight) Hours As Needed for indigestion. 118 mL 6   • calcium carbonate (CALCIUM ANTACID) 500 MG chewable tablet Chew 500 mg 3 (Three) Times a Day As Needed for Indigestion or Heartburn. 30 tablet 11   • docosanol (ABREVA) 10 % cream cream Apply  topically 5 (Five) Times a Day. 1 tube 3   • Multiple Vitamins-Minerals (HM MULTIVITAMIN ADULT GUMMY) chewable tablet 1 po qd   30 tablet 5   • sulfamethoxazole-trimethoprim (BACTRIM DS,SEPTRA DS) 800-160 MG per tablet Take 1 tablet by mouth 2 (Two) Times a Day. 6 tablet 0     No facility-administered medications prior to visit.         Patient Active Problem List   Diagnosis   • Spastic diplegic cerebral palsy (CMS/HCC)         Patient Care Team:  Naresh Gonzalez MD as PCP - General  Gavin Edwards MD as PCP - Claims Attributed  Health Habits:  Current Diet: Well balanced diet  Dental Exam. UTD  Eye Exam. UTD  Exercise: gym  Current exercise activities include:    Recent Hospitalizations:  none    Age-appropriate Screening Schedule:  Refer to the list below for future screening recommendations based on patient's age. Orders for these recommended tests are listed  in the plan section. The patient has been provided with a written plan.    Health Maintenance   Topic Date Due   • ZOSTER VACCINE (1 of 2) 01/19/2015   • INFLUENZA VACCINE  08/01/2019   • LIPID PANEL  04/05/2020   • MEDICARE ANNUAL WELLNESS  05/09/2020   • COLONOSCOPY  12/13/2026   • TDAP/TD VACCINES (2 - Td) 12/13/2026   • HEPATITIS C SCREENING  Completed       Depression Screen:   PHQ-2/PHQ-9 Depression Screening 5/9/2019   Little interest or pleasure in doing things 0   Feeling down, depressed, or hopeless 0   Total Score 0       Functional and Cognitive Screening:  Functional & Cognitive Status 5/9/2019   Do you have difficulty preparing food and eating? Yes   Do you have difficulty bathing yourself, getting dressed or grooming yourself? Yes   Do you have difficulty using the toilet? Yes   Do you have difficulty moving around from place to place? Yes   Do you have trouble with steps or getting out of a bed or a chair? Yes   In the past year have you fallen or experienced a near fall? Yes   Current Diet Well Balanced Diet   Dental Exam Up to date   Eye Exam Not up to date   Exercise (times per week) 2 times per week   Current Exercise Activities Include None   Do you need help using the phone?  Yes   Are you deaf or do you have serious difficulty hearing?  No   Do you need help with transportation? Yes   Do you need help shopping? Yes   Do you need help preparing meals?  Yes   Do you need help with housework?  Yes   Do you need help with laundry? Yes   Do you need help taking your medications? Yes   Do you need help managing money? Yes   Do you ever drive or ride in a car without wearing a seat belt? Yes   Have you felt unusual stress, anger or loneliness in the last month? Yes   Who do you live with? Community   If you need help, do you have trouble finding someone available to you? No   Have you been bothered in the last four weeks by sexual problems? No   Do you have difficulty concentrating, remembering or  making decisions? Yes     Does the patient have evidence of cognitive impairment? Yes, mild      Review of Systems   All other systems reviewed and are negative.      Objective     Vitals:    05/09/19 1421   BP: 110/74   Pulse: 92   Resp: 18   SpO2: 95%       There is no height or weight on file to calculate BMI.    PHYSICAL EXAM  Vitals reviewed and on chart.  HEENT: PERRLA, EOMI. Oral mucosa moist,   No LAD.  CV: RRR, no murmurs, rubs, clicks or gallops  LUNGS: CTA bilaterally  EXT: No edema, spasticity in bilateral upper and lower ext  NEURO: CN II - XII grossly intact        ASSESSMENT AND PLAN  Medications reviewed and updated on the chart.    Problem List Items Addressed This Visit        Nervous and Auditory    Spastic diplegic cerebral palsy (CMS/HCC) (Chronic)  Stable and living in a supervised community.      Other Visit Diagnoses     Routine general medical examination at a health care facility    -  Primary  Labs reviewed and all are normal/.          Orders:  No orders of the defined types were placed in this encounter.      Follow Up:  Return in about 1 year (around 5/9/2020) for Annual physical.     ADVANCED DIRECTIVES: yes    An After Visit Summary and PPPS with all of these plans were given to the patient.

## 2019-05-21 RX ORDER — OXYBUTYNIN CHLORIDE 5 MG/1
5 TABLET ORAL DAILY
Qty: 30 TABLET | Refills: 5 | Status: SHIPPED | OUTPATIENT
Start: 2019-05-21 | End: 2019-05-22

## 2019-05-22 RX ORDER — OXYBUTYNIN CHLORIDE 5 MG/1
5 TABLET, EXTENDED RELEASE ORAL DAILY
Qty: 30 TABLET | Refills: 5 | Status: SHIPPED | OUTPATIENT
Start: 2019-05-22 | End: 2019-10-31 | Stop reason: SDUPTHER

## 2019-09-09 RX ORDER — BACLOFEN 20 MG/1
TABLET ORAL
Qty: 112 TABLET | Refills: 6 | Status: SHIPPED | OUTPATIENT
Start: 2019-09-09 | End: 2020-03-20

## 2019-09-17 RX ORDER — MELATONIN 10 MG
TABLET, SUBLINGUAL SUBLINGUAL
Qty: 30 CAPSULE | Refills: 11 | Status: SHIPPED | OUTPATIENT
Start: 2019-09-17 | End: 2020-08-27

## 2019-10-04 RX ORDER — CETIRIZINE HYDROCHLORIDE 10 MG/1
TABLET ORAL
Qty: 30 TABLET | Refills: 10 | Status: SHIPPED | OUTPATIENT
Start: 2019-10-04 | End: 2020-08-27

## 2019-10-31 RX ORDER — DOCUSATE SODIUM 100 MG/1
CAPSULE, LIQUID FILLED ORAL
Qty: 28 CAPSULE | Refills: 11 | Status: SHIPPED | OUTPATIENT
Start: 2019-10-31 | End: 2020-10-08

## 2019-10-31 RX ORDER — OXYBUTYNIN CHLORIDE 5 MG/1
TABLET, EXTENDED RELEASE ORAL
Qty: 28 TABLET | Refills: 11 | Status: SHIPPED | OUTPATIENT
Start: 2019-10-31 | End: 2020-10-08

## 2019-11-08 ENCOUNTER — TELEPHONE (OUTPATIENT)
Dept: PEDIATRICS | Facility: OTHER | Age: 54
End: 2019-11-08

## 2019-11-08 DIAGNOSIS — R13.11 ORAL PHASE DYSPHAGIA: Primary | ICD-10-CM

## 2019-11-08 DIAGNOSIS — G80.0 SPASTIC QUADRIPLEGIC CEREBRAL PALSY (HCC): ICD-10-CM

## 2019-11-08 NOTE — TELEPHONE ENCOUNTER
Patients sister called in regards to her brothers condition. Yesterday patient choked on food and had to receive the heimlich. Over there past 6-9 months patient has been struggling with drinking and eating.  had the idea of patient doing a swallow test.  Please return call and advice the sister.    Marsha Elle- [phone]

## 2019-11-12 ENCOUNTER — TELEPHONE (OUTPATIENT)
Dept: FAMILY MEDICINE CLINIC | Facility: CLINIC | Age: 54
End: 2019-11-12

## 2019-11-12 NOTE — TELEPHONE ENCOUNTER
Patient's sister is checking to see if the swallow test that Dr. Gonzalez ordered was scheduled or if it can be soon.

## 2019-11-26 ENCOUNTER — TELEPHONE (OUTPATIENT)
Dept: FAMILY MEDICINE CLINIC | Facility: CLINIC | Age: 54
End: 2019-11-26

## 2020-01-31 RX ORDER — GUAIFENESIN 200 MG/1
TABLET ORAL
Qty: 30 TABLET | Refills: 5 | Status: SHIPPED | OUTPATIENT
Start: 2020-01-31 | End: 2020-12-31

## 2020-01-31 RX ORDER — OMEPRAZOLE 40 MG/1
CAPSULE, DELAYED RELEASE ORAL
Qty: 28 CAPSULE | Refills: 11 | Status: SHIPPED | OUTPATIENT
Start: 2020-01-31 | End: 2020-12-31

## 2020-03-16 ENCOUNTER — TELEPHONE (OUTPATIENT)
Dept: FAMILY MEDICINE CLINIC | Facility: CLINIC | Age: 55
End: 2020-03-16

## 2020-03-16 NOTE — TELEPHONE ENCOUNTER
LUCINA FROM [facility] CALLED IN REGARDS TO IBUPROFEN FOR PAIN, THEY ARE REQUESTING ANOTHER SCRIPT FOR FEVER OR TO ADD TO THE PAIN SCRIPT THAT HE ALREADY HAS. HE HAS NO FEVER, THIS IS FOR JUST IN CASE PURPOSES.    PLEASE INDICATE ON SCRIPT AT WHAT TEMPERATURE IT IS OKAY TO ADMINISTER   [facility] [address] [place] [address] - [phone]  - [phone] FX  [phone]    LUCINA'S CALL BACK NUMBER [phone]

## 2020-03-19 ENCOUNTER — TELEPHONE (OUTPATIENT)
Dept: FAMILY MEDICINE CLINIC | Facility: CLINIC | Age: 55
End: 2020-03-19

## 2020-03-19 NOTE — TELEPHONE ENCOUNTER
PATIENT ASSISTED  LIVING ([facility])  CALLED STATING THAT PATIENT HAD MISSED  baclofen (LIORESAL) 20 MG tablet AT 12 NOON 3/18/2020. SHE HAS TO CALL IT IN FOR PROTOCOL. NOTHING IS WRONG SHE JUST HAS TO CALL IT IN STATE LAW.     PATIENT CAN BE REACHED AT:[phone]

## 2020-03-20 RX ORDER — BACLOFEN 20 MG/1
TABLET ORAL
Qty: 112 TABLET | Refills: 11 | Status: SHIPPED | OUTPATIENT
Start: 2020-03-20 | End: 2021-02-18

## 2020-03-20 RX ORDER — DIAPER,BRIEF,INFANT-TODD,DISP
EACH MISCELLANEOUS
Qty: 28.4 G | Refills: 11 | Status: SHIPPED | OUTPATIENT
Start: 2020-03-20 | End: 2021-03-04

## 2020-03-20 RX ORDER — IBUPROFEN 200 MG
TABLET ORAL
Qty: 28 TABLET | Refills: 11 | Status: SHIPPED | OUTPATIENT
Start: 2020-03-20 | End: 2021-02-04

## 2020-04-23 RX ORDER — DIMETHICONE, CAMPHOR (SYNTHETIC), MENTHOL, AND PHENOL 1.1; .5; .625; .5 G/100G; G/100G; G/100G; G/100G
1 OINTMENT TOPICAL AS NEEDED
Qty: 1 TUBE | Refills: 11 | Status: SHIPPED | OUTPATIENT
Start: 2020-04-23 | End: 2021-06-01

## 2020-08-27 RX ORDER — CETIRIZINE HYDROCHLORIDE 10 MG/1
TABLET ORAL
Qty: 30 TABLET | Refills: 11 | Status: SHIPPED | OUTPATIENT
Start: 2020-08-27 | End: 2021-02-10

## 2020-08-27 RX ORDER — MELATONIN 10 MG
TABLET, SUBLINGUAL SUBLINGUAL
Qty: 30 CAPSULE | Refills: 11 | Status: SHIPPED | OUTPATIENT
Start: 2020-08-27 | End: 2021-02-10

## 2020-10-08 RX ORDER — OXYBUTYNIN CHLORIDE 5 MG/1
TABLET, EXTENDED RELEASE ORAL
Qty: 28 TABLET | Refills: 3 | Status: SHIPPED | OUTPATIENT
Start: 2020-10-08 | End: 2021-02-10

## 2020-10-08 RX ORDER — DOCUSATE SODIUM 100 MG/1
CAPSULE, LIQUID FILLED ORAL
Qty: 28 CAPSULE | Refills: 3 | Status: SHIPPED | OUTPATIENT
Start: 2020-10-08 | End: 2021-02-10

## 2020-12-31 RX ORDER — GUAIFENESIN 200 MG/1
TABLET ORAL
Qty: 28 TABLET | Refills: 0 | Status: SHIPPED | OUTPATIENT
Start: 2020-12-31 | End: 2021-02-04

## 2020-12-31 RX ORDER — OMEPRAZOLE 40 MG/1
CAPSULE, DELAYED RELEASE ORAL
Qty: 28 CAPSULE | Refills: 0 | Status: SHIPPED | OUTPATIENT
Start: 2020-12-31 | End: 2021-02-10

## 2021-01-21 ENCOUNTER — TELEPHONE (OUTPATIENT)
Dept: FAMILY MEDICINE CLINIC | Facility: CLINIC | Age: 56
End: 2021-01-21

## 2021-01-21 NOTE — TELEPHONE ENCOUNTER
JOSE LUIS, PATIENT'S SISTER, IS CALLING TO FOLLOW UP ON A REQUEST FROM  SUHAS MORENO, PATIENT'S  AT [address] [place] [address].  SHE STATES THAT SUHAS SENT OVER A REQUEST THAT NEEDS TO BE RETURNED REGARDING A MASSAGE CHAIR AND AN OKAY FOR OTHER MEDICAL SUPPLIES (i.e. BED ALARM AND BARS FOR BATHROOM) AND INCONTINENCE SUPPLIES    SUHAS MORENO CAN BE REACHED AT  [phone] - MAIN  [phone] - FAX     PLEASE ADVISE    JOSE LUIS CAN BE REACHED AT  [phone]

## 2021-01-27 ENCOUNTER — TELEPHONE (OUTPATIENT)
Dept: FAMILY MEDICINE CLINIC | Facility: CLINIC | Age: 56
End: 2021-01-27

## 2021-01-27 ENCOUNTER — OFFICE VISIT (OUTPATIENT)
Dept: FAMILY MEDICINE CLINIC | Facility: CLINIC | Age: 56
End: 2021-01-27

## 2021-01-27 VITALS — DIASTOLIC BLOOD PRESSURE: 98 MMHG | SYSTOLIC BLOOD PRESSURE: 144 MMHG

## 2021-01-27 DIAGNOSIS — Z00.00 MEDICARE ANNUAL WELLNESS VISIT, SUBSEQUENT: ICD-10-CM

## 2021-01-27 DIAGNOSIS — G80.1 SPASTIC DIPLEGIC CEREBRAL PALSY (HCC): Primary | ICD-10-CM

## 2021-01-27 PROCEDURE — G0439 PPPS, SUBSEQ VISIT: HCPCS | Performed by: NURSE PRACTITIONER

## 2021-01-27 NOTE — PROGRESS NOTES
Medicare Subsequent Wellness Visit  Subjective   Tiki Alcantar is a 56 y.o. male who presents for an Medicare Wellness Visit. In addition, we addressed the following health issues:      PMH, PSH, SocHx, FamHx, Allergies, and Medications: Reviewed and updated in the history section of chart.  Family History   Problem Relation Age of Onset   • Diabetes Mother    • Thyroid disease Mother    • Hypertension Mother    • Diabetes Father    • Diabetes Sister    • Uterine cancer Sister    • Throat cancer Sister    • Rectal cancer Sister    • Rheum arthritis Sister    • Hypertension Sister    • Diabetes Brother    • Hypertension Brother    • Cancer Paternal Aunt         Bladder.   • Colon cancer Paternal Uncle    • Heart disease Maternal Grandmother    • Heart attack Maternal Grandmother        Social History     Social History Narrative    Lives at [facility]       No Known Allergies    Outpatient Medications Prior to Visit   Medication Sig Dispense Refill   • acetaminophen (TYLENOL) 500 MG tablet Take 1 tablet by mouth Every 6 (Six) Hours As Needed for mild pain (1-3). 90 tablet 3   • baclofen (LIORESAL) 20 MG tablet TAKE 1 TABLET BY MOUTH 4 TIMES DAILY 112 tablet 11   • cetirizine (zyrTEC) 10 MG tablet TAKE 1 TABLET BY MOUTH DAILY 30 tablet 11   • Cold Sore Products (BLISTEX OINTMENT) ointment Apply 1 application topically As Needed (dry lips). 1 tube 11   •  MG capsule TAKE 1 CAPSULE BY MOUTH AT BEDTIME 28 capsule 3   • guaiFENesin 200 MG tablet ##TAKE 1 TABLET BY MOUTH EVERY 6 HOURS AS NEEDED FOR COUGH 28 tablet 0   • hydrocortisone 1 % cream APPLY 1 APPLICATION TOPICALLY TO THE APPROPRIATE AREA AS DIRECTED 4 TIMES DAILY AS NEEDED FOR SKIN IRRITATION 28.4 g 11   • Hydrocortisone-Aloe Vera (GNP HYDROCORTISONE/ALOE) 1 % cream Apply 1 application topically to the appropriate area as directed 4 (Four) Times a Day As Needed (skin irritation). 28.4 g 6   • ibuprofen (ADVIL,MOTRIN) 200 MG tablet TAKE TWO TABLETS BY  MOUTH THREE TIMES DAILY AS NEEDED FOR PAIN 30 tablet 6   • ibuprofen (ADVIL,MOTRIN) 200 MG tablet ##TAKE 1 TABLET BY MOUTH EVERY 8 HOURS AS NEEDED FOR MILD PAIN 28 tablet 11   • omeprazole (priLOSEC) 40 MG capsule TAKE 1 CAPSULE BY MOUTH AT BEDTIME 28 capsule 0   • oxybutynin XL (DITROPAN-XL) 5 MG 24 hr tablet TAKE 1 TABLET BY MOUTH DAILY 28 tablet 3   • Pediatric Multiple Vit-C-FA (multivitamin with C  & folic acid) with C & FA chewable tablet chewable tablet CHEW 1 TABLET ORALLY ONCE DAILY 28 tablet 0   • Probiotic Product (ADVANCED PROBIOTIC) capsule TAKE 1 CAPSULE BY MOUTH DAILY 30 capsule 11   • tiZANidine (ZANAFLEX) 2 MG tablet TAKE 1 TABLET BY MOUTH TWICE DAILY 56 tablet 11   • ibuprofen (ADVIL,MOTRIN) 200 MG tablet Take 1 tablet by mouth Every 8 (Eight) Hours As Needed for Mild Pain . 30 tablet 11     No facility-administered medications prior to visit.         Patient Active Problem List   Diagnosis   • Spastic diplegic cerebral palsy (CMS/HCC)         Patient Care Team:  Narseh Gonzalez MD as PCP - General  Health Habits:  Current Diet: Well balanced diet  Tobacco use.  Pack years:  Dental Exam.Going next week  Eye Exam Not needed  Exercise:used weights and then covid  Current exercise activities include:Yoga    Recent Hospitalizations:  none    Age-appropriate Screening Schedule:  Refer to the list below for future screening recommendations based on patient's age. Orders for these recommended tests are listed in the plan section. The patient has been provided with a written plan.      Health Maintenance   Topic Date Due   • ZOSTER VACCINE (1 of 2) 01/19/2015   • LIPID PANEL  04/05/2020   • ANNUAL WELLNESS VISIT  05/09/2020   • INFLUENZA VACCINE  08/01/2020   • COLONOSCOPY  12/13/2026   • TDAP/TD VACCINES (2 - Td) 12/13/2026   • HEPATITIS C SCREENING  Completed   • Pneumococcal Vaccine 0-64  Aged Out   • MENINGOCOCCAL VACCINE  Aged Out       Depression Screen:   PHQ-2/PHQ-9 Depression Screening  5/9/2019   Little interest or pleasure in doing things 0   Feeling down, depressed, or hopeless 0   Total Score 0       Functional and Cognitive Screening:  Functional & Cognitive Status 1/27/2021   Do you have difficulty preparing food and eating? Yes   Do you have difficulty bathing yourself, getting dressed or grooming yourself? Yes   Do you have difficulty using the toilet? Yes   Do you have difficulty moving around from place to place? Yes   Do you have trouble with steps or getting out of a bed or a chair? Yes   Current Diet Well Balanced Diet   Dental Exam Up to date   Eye Exam Up to date   Exercise (times per week) 3 times per week   Current Exercise Activities Include Light Weight/Kettebells   Do you need help using the phone?  Yes   Are you deaf or do you have serious difficulty hearing?  No   Do you need help with transportation? Yes   Do you need help shopping? Yes   Do you need help preparing meals?  Yes   Do you need help with housework?  Yes   Do you need help with laundry? Yes   Do you need help taking your medications? Yes   Do you need help managing money? Yes   Do you ever drive or ride in a car without wearing a seat belt? No   Have you felt unusual stress, anger or loneliness in the last month? No   Who do you live with? Community   If you need help, do you have trouble finding someone available to you? No   Have you been bothered in the last four weeks by sexual problems? No   Do you have difficulty concentrating, remembering or making decisions? No     Does the patient have evidence of cognitive impairment? No     Compared to one year ago, the patient feels their physical health and mental health are the same. Yes      Review of Systems   Constitutional: Negative for activity change.       Objective     Vitals:    01/27/21 1512   BP: 144/98   Repeat /86    There is no height or weight on file to calculate BMI.    PHYSICAL EXAM  Vitals reviewed and on chart.  HEENT: MAEGAN BARRETO. Oral  mucosa moist,   No LAD.  CV: RRR, no murmurs, rubs, clicks or gallops  LUNGS: CTA bilaterally  EXT: No edema, FROM in bilateral upper and lower ext  NEURO: CN II - XII grossly intact  PSYCH: good mood, positive affect, alert and engaged. No thoughts of self harm  expressed.    ASSESSMENT AND PLAN    Reviewed and filled out his paperwork that will he will be taking with him.  He is to call the office for any issues.          Problem List Items Addressed This Visit        Neuro    Spastic diplegic cerebral palsy (CMS/[facility]) - Primary (Chronic)    Relevant Orders    Ambulatory Referral to Physical Therapy Evaluate and treat    Ambulatory Referral to Occupational Therapy      Other Visit Diagnoses     Medicare annual wellness visit, subsequent              Orders:  Orders Placed This Encounter   Procedures   • Ambulatory Referral to Physical Therapy Evaluate and treat   • Ambulatory Referral to Occupational Therapy       Follow Up:  No follow-ups on file.     ADVANCED DIRECTIVES:   ACP discussion was held with the patient during this visit. Patient has an advance directive in EMR which is still valid.     An After Visit Summary and PPPS with all of these plans were given to the patient.

## 2021-01-27 NOTE — TELEPHONE ENCOUNTER
[phone] Marsha Almeida (EC)      PATIENT'S SISTER WANTED TO LET YOU KNOW THAT THE PT/OT WILL NEED TO GO TO HIM, INSTEAD OF HIM GOING TO PT/OT    SHE IS WANTING HIM TO GET FAMILIARIZED WITH HIS NEW PLACE.     CALL SISTER WITH ANY ADDITIONAL QUESTIONS.

## 2021-01-28 NOTE — TELEPHONE ENCOUNTER
SISTER JOSE LUIS CALLING BACK TODAY WANTING TO LET DR. ZUÑIGA KNOW PATIENT JUST MOVED INTO HIS [facility]ROUGH [facility] TODAY. SO THE SOONER THEY CAN GET PT OR OT THERE WOULD BE BEST. [facility] ID REQUESTING TO TRY AND GET OT OR PT IN HOME AS SOON AS TOMORROW OR BEGIINING OF NEXT WEEK.     PATIENTS SISTER JOSE LUIS CAN BE REACHED AT: [phone]

## 2021-01-28 NOTE — PATIENT INSTRUCTIONS
Preventive Care 40-64 Years Old, Male  Preventive care refers to lifestyle choices and visits with your health care provider that can promote health and wellness. This includes:  · A yearly physical exam. This is also called an annual well check.  · Regular dental and eye exams.  · Immunizations.  · Screening for certain conditions.  · Healthy lifestyle choices, such as eating a healthy diet, getting regular exercise, not using drugs or products that contain nicotine and tobacco, and limiting alcohol use.  What can I expect for my preventive care visit?  Physical exam  Your health care provider will check:  · Height and weight. These may be used to calculate body mass index (BMI), which is a measurement that tells if you are at a healthy weight.  · Heart rate and blood pressure.  · Your skin for abnormal spots.  Counseling  Your health care provider may ask you questions about:  · Alcohol, tobacco, and drug use.  · Emotional well-being.  · Home and relationship well-being.  · Sexual activity.  · Eating habits.  · Work and work environment.  What immunizations do I need?    Influenza (flu) vaccine  · This is recommended every year.  Tetanus, diphtheria, and pertussis (Tdap) vaccine  · You may need a Td booster every 10 years.  Varicella (chickenpox) vaccine  · You may need this vaccine if you have not already been vaccinated.  Zoster (shingles) vaccine  · You may need this after age 60.  Measles, mumps, and rubella (MMR) vaccine  · You may need at least one dose of MMR if you were born in 1957 or later. You may also need a second dose.  Pneumococcal conjugate (PCV13) vaccine  · You may need this if you have certain conditions and were not previously vaccinated.  Pneumococcal polysaccharide (PPSV23) vaccine  · You may need one or two doses if you smoke cigarettes or if you have certain conditions.  Meningococcal conjugate (MenACWY) vaccine  · You may need this if you have certain conditions.  Hepatitis A  vaccine  · You may need this if you have certain conditions or if you travel or work in places where you may be exposed to hepatitis A.  Hepatitis B vaccine  · You may need this if you have certain conditions or if you travel or work in places where you may be exposed to hepatitis B.  Haemophilus influenzae type b (Hib) vaccine  · You may need this if you have certain risk factors.  Human papillomavirus (HPV) vaccine  · If recommended by your health care provider, you may need three doses over 6 months.  You may receive vaccines as individual doses or as more than one vaccine together in one shot (combination vaccines). Talk with your health care provider about the risks and benefits of combination vaccines.  What tests do I need?  Blood tests  · Lipid and cholesterol levels. These may be checked every 5 years, or more frequently if you are over 50 years old.  · Hepatitis C test.  · Hepatitis B test.  Screening  · Lung cancer screening. You may have this screening every year starting at age 55 if you have a 30-pack-year history of smoking and currently smoke or have quit within the past 15 years.  · Prostate cancer screening. Recommendations will vary depending on your family history and other risks.  · Colorectal cancer screening. All adults should have this screening starting at age 50 and continuing until age 75. Your health care provider may recommend screening at age 45 if you are at increased risk. You will have tests every 1-10 years, depending on your results and the type of screening test.  · Diabetes screening. This is done by checking your blood sugar (glucose) after you have not eaten for a while (fasting). You may have this done every 1-3 years.  · Sexually transmitted disease (STD) testing.  Follow these instructions at home:  Eating and drinking  · Eat a diet that includes fresh fruits and vegetables, whole grains, lean protein, and low-fat dairy products.  · Take vitamin and mineral supplements as  recommended by your health care provider.  · Do not drink alcohol if your health care provider tells you not to drink.  · If you drink alcohol:  ? Limit how much you have to 0-2 drinks a day.  ? Be aware of how much alcohol is in your drink. In the [place] one drink equals one 12 oz bottle of beer (355 mL), one 5 oz glass of wine (148 mL), or one 1½ oz glass of hard liquor (44 mL).  Lifestyle  · Take daily care of your teeth and gums.  · Stay active. Exercise for at least 30 minutes on 5 or more days each week.  · Do not use any products that contain nicotine or tobacco, such as cigarettes, e-cigarettes, and chewing tobacco. If you need help quitting, ask your health care provider.  · If you are sexually active, practice safe sex. Use a condom or other form of protection to prevent STIs (sexually transmitted infections).  · Talk with your health care provider about taking a low-dose aspirin every day starting at age 50.  What's next?  · Go to your health care provider once a year for a well check visit.  · Ask your health care provider how often you should have your eyes and teeth checked.  · Stay up to date on all vaccines.  This information is not intended to replace advice given to you by your health care provider. Make sure you discuss any questions you have with your health care provider.  Document Revised: 12/12/2019 Document Reviewed: 12/12/2019  Elsevier Patient Education © 2020 [employer]vier Inc.

## 2021-01-29 RX ORDER — TIZANIDINE 2 MG/1
2 TABLET ORAL 2 TIMES DAILY
Qty: 60 TABLET | Refills: 11 | Status: SHIPPED | OUTPATIENT
Start: 2021-01-29 | End: 2021-02-09

## 2021-02-01 ENCOUNTER — TELEPHONE (OUTPATIENT)
Dept: FAMILY MEDICINE CLINIC | Facility: CLINIC | Age: 56
End: 2021-02-01

## 2021-02-01 DIAGNOSIS — G80.0 SPASTIC QUADRIPLEGIC CEREBRAL PALSY (HCC): Primary | ICD-10-CM

## 2021-02-01 NOTE — TELEPHONE ENCOUNTER
Caller: Suresh Alcantar    Relationship to patient: Self    Best call back number: [phone]    Patient is needing: TERRY JUNIOR -  [facility] - SAID THAT REFERRAL FOR PHYSICAL THERAPY WAS FOR OUTPATIENT, BUT IT NEEDED TO BE FOR IN HOME PHYSICAL THERAPY. TERRY CAN BE REACHED [phone].

## 2021-02-04 ENCOUNTER — TELEPHONE (OUTPATIENT)
Dept: FAMILY MEDICINE CLINIC | Facility: CLINIC | Age: 56
End: 2021-02-04

## 2021-02-04 RX ORDER — IBUPROFEN 200 MG
TABLET ORAL
Qty: 28 TABLET | Refills: 11 | Status: SHIPPED | OUTPATIENT
Start: 2021-02-04 | End: 2022-11-10 | Stop reason: SDUPTHER

## 2021-02-04 RX ORDER — GUAIFENESIN 200 MG/1
TABLET ORAL
Qty: 28 TABLET | Refills: 0 | Status: SHIPPED | OUTPATIENT
Start: 2021-02-04 | End: 2022-04-18 | Stop reason: SDUPTHER

## 2021-02-04 RX ORDER — CALCIUM CARBONATE 500 MG/1
TABLET, CHEWABLE ORAL
Qty: 30 TABLET | Refills: 10 | Status: SHIPPED | OUTPATIENT
Start: 2021-02-04 | End: 2022-11-10 | Stop reason: SDUPTHER

## 2021-02-04 RX ORDER — IBUPROFEN 200 MG
TABLET ORAL
Qty: 180 TABLET | Refills: 0 | Status: SHIPPED | OUTPATIENT
Start: 2021-02-04 | End: 2021-02-04 | Stop reason: SDUPTHER

## 2021-02-04 NOTE — TELEPHONE ENCOUNTER
DEBBIE FROM [facility] CALLED ASKING FOR [facility] ORDERS FOR OCCUPATIONAL THERAPY.    CALL BACK NUMBER: [phone]

## 2021-02-08 ENCOUNTER — TELEPHONE (OUTPATIENT)
Dept: FAMILY MEDICINE CLINIC | Facility: CLINIC | Age: 56
End: 2021-02-08

## 2021-02-08 NOTE — TELEPHONE ENCOUNTER
MUNA FROM [facility] STATES THE PATIENT RECIEVED SERVICES FROM [facility], BUT IS NOW RECIEVING SERVICES FROM [facility].   FAX: [phone]    MUNA CAN BE REACHED [phone].

## 2021-02-08 NOTE — TELEPHONE ENCOUNTER
Caller: Marsha Almeida    Relationship to patient: Emergency Contact    Best call back number: [phone]    Patient is needing:     MARSHA ON  [facility] CALLED IN STATING LEXI MOVED RECENTLY, AND 2 YEARS AGO PATIENT WAS ON TWO MUSCLE RELAXERS, SOMEHOW THE MEDICATIONS GOT MIXED UP AND THE PATIENT HAS BEEN PRESCRIBED:    tiZANidine (ZANAFLEX) 2 MG tablet  AGAIN AND HAS BEEN TAKING IT FOR TWO WEEKS. PATIENT IS HAVING REACTIONS TO THIS MEDICATION :  MAKING PATIENT LETHARGIC, CANT SWALLOW HIS FOOD PROPERLY, GIVING THE PATIENT A COUGH, WAS TAKEN OFF THIS MEDICATION A LONG TIME AGO BUT IS STILL BEING PRESCRIBED IT.     PATIENT WAS TAKEN OFF THIS MEDICATIONS YEARS AGO.  BUT IS STILL BEING PRESCRIBED IT.     MARSHA STATED LEXI'S SISTER IS FLUSHING THIS MEDICATION COMPLETELY, THEY DON'T WANT LEXI ON IT.   PATIENT IS REQUESTING A WRITTEN ORDER TO BE REMOVED FROM THIS MEDICATION. THIS ORDER NEEDS TO GO TO:  [facility] AND PATIENTS PH:     [facility] - [place], [place] - [phone] PH      PLEASE CALL AND ADVISE: [phone]

## 2021-02-10 RX ORDER — CETIRIZINE HYDROCHLORIDE 10 MG/1
TABLET ORAL
Qty: 259 TABLET | Refills: 2 | Status: SHIPPED | OUTPATIENT
Start: 2021-02-10 | End: 2021-06-04 | Stop reason: SDUPTHER

## 2021-02-10 RX ORDER — MELATONIN 10 MG
TABLET, SUBLINGUAL SUBLINGUAL
Qty: 265 CAPSULE | Refills: 2 | Status: SHIPPED | OUTPATIENT
Start: 2021-02-10 | End: 2021-06-04 | Stop reason: SDUPTHER

## 2021-02-10 RX ORDER — DOCUSATE SODIUM 100 MG/1
CAPSULE, LIQUID FILLED ORAL
Qty: 63 CAPSULE | Refills: 2 | Status: SHIPPED | OUTPATIENT
Start: 2021-02-10 | End: 2021-07-30

## 2021-02-10 RX ORDER — OXYBUTYNIN CHLORIDE 5 MG/1
TABLET, EXTENDED RELEASE ORAL
Qty: 63 TABLET | Refills: 3 | Status: SHIPPED | OUTPATIENT
Start: 2021-02-10 | End: 2021-10-21

## 2021-02-10 RX ORDER — OMEPRAZOLE 40 MG/1
CAPSULE, DELAYED RELEASE ORAL
Qty: 35 CAPSULE | Refills: 2 | Status: SHIPPED | OUTPATIENT
Start: 2021-02-10 | End: 2021-04-09

## 2021-02-12 ENCOUNTER — TELEPHONE (OUTPATIENT)
Dept: FAMILY MEDICINE CLINIC | Facility: CLINIC | Age: 56
End: 2021-02-12

## 2021-02-12 NOTE — TELEPHONE ENCOUNTER
Caller: Tavia     Relationship to patient: [facility] Provider     Best call back number: [phone]    Patient is needing: Tavia called from [facility] to report that patient has been seen 3 times for PT, 2 times for Occupational Therapy (and is scheduled for another session today)

## 2021-02-17 PROCEDURE — G0180 MD CERTIFICATION HHA PATIENT: HCPCS | Performed by: FAMILY MEDICINE

## 2021-02-18 ENCOUNTER — OFFICE VISIT (OUTPATIENT)
Dept: FAMILY MEDICINE CLINIC | Facility: CLINIC | Age: 56
End: 2021-02-18

## 2021-02-18 ENCOUNTER — TELEPHONE (OUTPATIENT)
Dept: FAMILY MEDICINE CLINIC | Facility: CLINIC | Age: 56
End: 2021-02-18

## 2021-02-18 VITALS — RESPIRATION RATE: 20 BRPM | TEMPERATURE: 97.6 F | OXYGEN SATURATION: 99 % | HEART RATE: 68 BPM

## 2021-02-18 DIAGNOSIS — R05.9 COUGH: Primary | ICD-10-CM

## 2021-02-18 DIAGNOSIS — G80.2 SPASTIC HEMIPLEGIC CEREBRAL PALSY (HCC): ICD-10-CM

## 2021-02-18 PROCEDURE — 99213 OFFICE O/P EST LOW 20 MIN: CPT | Performed by: NURSE PRACTITIONER

## 2021-02-18 RX ORDER — BACLOFEN 20 MG/1
TABLET ORAL
Qty: 120 TABLET | Refills: 1 | Status: SHIPPED | OUTPATIENT
Start: 2021-02-18 | End: 2021-04-09

## 2021-02-18 NOTE — TELEPHONE ENCOUNTER
Patient: Yoli Tyson Date of Service: 2020   : 1949 MRN: [id number]     SUBJECTIVE:     HISTORY OF PRESENT ILLNESS:  Yoli Tyson is a 70 year old female who presents today for follow up of abd pain     Persists despite getting the bowels to move  No BM this am     No fevers     No nausea no emesis     Pain is on the right side   Hurts when she is walking     No loose stools     Has never had diverticulitis   Has hx pockets    Pain is worse later after she eats     Lower abd feels full and bloaty     Overtime thinks it has gotten a little better     PAST MEDICAL HISTORY:  Past Medical History:   Diagnosis Date   • Depression    • Essential (primary) hypertension    • Gastroesophageal reflux disease    • Impacted cerumen    • PONV (postoperative nausea and vomiting)    • Stroke (CMS/HCC)    • Thyroid condition     thyroid cancer        MEDICATIONS:  Current Outpatient Medications   Medication Sig   • amoxicillin-clavulanate (AUGMENTIN) 875-125 MG per tablet Take 1 tablet by mouth 2 times daily for 10 days.   • amLODIPine (NORVASC) 5 MG tablet Take 1.5 tablets by mouth daily.   • omeprazole (PRILOSEC) 20 MG capsule TAKE ONE CAPSULE BY MOUTH EVERY DAY   • losartan (COZAAR) 50 MG tablet TAKE 1 TABLET BY MOUTH DAILY   • HYDROcodone-acetaminophen (NORCO) 5-325 MG per tablet Take 1 tablet by mouth every 6 hours as needed for Pain.   • ALPRAZolam (XANAX) 0.5 MG tablet Take 0.5 tablets by mouth as needed for Anxiety.   • clopidogrel (PLAVIX) 75 MG tablet Take 1 tablet by mouth daily.   • atorvastatin (LIPITOR) 20 MG tablet Take 1 tablet by mouth daily. (Patient taking differently: Take 20 mg by mouth nightly. )   • meloxicam (MOBIC) 7.5 MG tablet TAKE 1 TABLET BY MOUTH TWICE DAILY   • nystatin (MYCOSTATIN) 731494 UNIT/GM cream Apply topically 2 times daily.   • levothyroxine (SYNTHROID, LEVOTHROID) 112 MCG tablet TAKE 1 TABLET BY MOUTH DAILY   • risperiDONE (RISPERDAL) 0.5 MG tablet Take 1.5 mg by mouth  Last appointment 01/27/21  Last refill 03/20/20   nightly. For depression   • trazodone (DESYREL) 150 MG tablet Take 150 mg by mouth nightly.    • citalopram (CELEXA) 40 MG tablet 60 mg.    • buPROPion (WELLBUTRIN XL) 150 MG 24 hr tablet TK 1 T PO D   • mometasone (NASONEX) 50 MCG/ACT nasal spray Spray 2 sprays in each nostril daily.     No current facility-administered medications for this visit.        ALLERGIES:  ALLERGIES:   Allergen Reactions   • Tetracycline GI UPSET       PAST SURGICAL HISTORY:  Past Surgical History:   Procedure Laterality Date   • Ankle surgery Left     plate and 12 screws   • Cervical fusion     • Thyroidectomy     • Tonsillectomy     • Tubal ligation         FAMILY HISTORY:  No family history on file.    SOCIAL HISTORY:  Social History     Tobacco Use   • Smoking status: Former Smoker     Last attempt to quit:      Years since quittin.5   • Smokeless tobacco: Never Used   Substance Use Topics   • Alcohol use: Yes     Alcohol/week: 7.0 - 8.0 standard drinks     Types: 7 - 8 Standard drinks or equivalent per week   • Drug use: Yes     Types: Marijuana     Comment: less than once a month       Review of Systems   Respiratory: Negative.    Cardiovascular: Negative.          OBJECTIVE:     Physical Exam   Constitutional: She is oriented to person, place, and time. She appears well-developed and well-nourished.   HENT:   Head: Normocephalic and atraumatic.   Cardiovascular: Normal rate, regular rhythm and normal heart sounds.   Pulmonary/Chest: Effort normal and breath sounds normal.   Abdominal: Soft. There is abdominal tenderness.   Neurological: She is alert and oriented to person, place, and time.   Skin: Skin is warm.   Psychiatric: She has a normal mood and affect. Her behavior is normal. Thought content normal.   Nursing note and vitals reviewed.      Visit Vitals  /70   Pulse 77   Temp 98.7 °F (37.1 °C)   Resp 14   Ht 5' 5\" (1.651 m)   Wt 92.5 kg (204 lb)   LMP  (LMP Unknown)   SpO2 97%   BMI 33.95 kg/m²         Wt  Readings from Last 1 Encounters:   07/29/20 92.5 kg (204 lb)          Assessment AND PLAN:     This is a 70 year old year-old female who presents with     Diagnoses and all orders for this visit:  Right lower quadrant abdominal pain  -     amoxicillin-clavulanate (AUGMENTIN) 875-125 MG per tablet; Take 1 tablet by mouth 2 times daily for 10 days.  -     COMPREHENSIVE METABOLIC PANEL  -     CBC WITH DIFFERENTIAL  Diverticulitis of colon  rec clears and easy to digest, advance as feeling better   Ok to cont miralax   Start antibiotics today   1 tab twice a day   Eat yogurt     Update via portal in a couple days to decide on next step  CT scan next   To ER if worse-     Return if symptoms worsen or fail to improve.    The patient indicated understanding of the diagnosis and agreed with the plan of care.      Nini Saleh, DO  7/29/2020

## 2021-02-18 NOTE — TELEPHONE ENCOUNTER
.  I spoke to his sister and he has a deep cough that will go on for 2-3 mins and then go away.  They have used otc cough meds w/o success.  Marsha states this started afer he moved and there was a change in his meds, he was takeing both Baclofen and Tizanadine.  The Tizanadine had be dc'd previously because it made him cough.  Marsha is wondering crystal they should do.

## 2021-02-18 NOTE — PROGRESS NOTES
Subjective   Suresh Alcantar is a 56 y.o. male.   Has had some coughing.  History of Present Illness   Beard past medical and history includes cerebral palsy.  He is currently in a apartment with 24-hour care.  We were notified by his sister that he has started with some coughing that is intermittent throughout the day.  He does not seem to be distressed by its is wondering what is going on.  The following portions of the patient's history were reviewed and updated as appropriate: allergies, current medications, past family history, past medical history, past social history, past surgical history and problem list.    Review of Systems   Constitutional: Negative for activity change, appetite change, chills and fever.   HENT: Negative for congestion, ear discharge, ear pain, rhinorrhea, sinus pain and sore throat.    Respiratory: Positive for cough. Negative for shortness of breath.    Gastrointestinal: Negative for diarrhea and vomiting.   Neurological: Negative for headaches.   Psychiatric/Behavioral: Negative for agitation and behavioral problems.       Objective   Physical Exam  Vitals signs and nursing note reviewed.   Constitutional:       General: He is not in acute distress.     Appearance: He is well-developed.   HENT:      Head: Normocephalic and atraumatic.      Right Ear: External ear normal.      Left Ear: External ear normal.   Eyes:      Pupils: Pupils are equal, round, and reactive to light.   Neck:      Musculoskeletal: Neck supple.   Cardiovascular:      Rate and Rhythm: Normal rate and regular rhythm.   Pulmonary:      Effort: Pulmonary effort is normal.      Breath sounds: Normal breath sounds.   Lymphadenopathy:      Cervical: No cervical adenopathy.   Skin:     General: Skin is warm and dry.   Neurological:      Mental Status: He is alert and oriented to person, place, and time.     Due to inclement weather I thought it be best that I make a home visit.  I went to [address] [facility]  Carlos A was in his chair.  He was in no distress he denied any problems.  His whole exam was normal I did asked them to get a hold of the night person to see if he was having these issues at night.  According to him he sleeps through the night and has no problems there is no coughing.  The staff reports it is intermittent he coughs for 5 times his face gets red then he stops.      Assessment/Plan   Diagnoses and all orders for this visit:    1. Cough (Primary)    2. Spastic hemiplegic cerebral palsy (CMS/[facility])    Advise the caretakers that if there was a problem like a call.  Once again Carlos A was in no distress he is smiling he is happy.  We will touch base with his sister

## 2021-02-24 ENCOUNTER — TELEPHONE (OUTPATIENT)
Dept: FAMILY MEDICINE CLINIC | Facility: CLINIC | Age: 56
End: 2021-02-24

## 2021-02-24 NOTE — TELEPHONE ENCOUNTER
PATIENT'S SISTER IS CALLING TO GET AN UPDATE ON LAST VISIT. PLEASE ADVISE.    CALL BACK: [phone]

## 2021-02-24 NOTE — TELEPHONE ENCOUNTER
Spoke with Carlos A Sister Natalie who reports that his cough is getting better but he is still coughing and the people that are taking care of him are concerned.  We discussed his GERD medicine he is on 40 mg of omeprazole daily.  She is going to spend the night tonight in give me an update tomorrow.  In I will talk to Dr. Gonzalez to see if there is anything else we can do.

## 2021-03-02 ENCOUNTER — TELEPHONE (OUTPATIENT)
Dept: FAMILY MEDICINE CLINIC | Facility: CLINIC | Age: 56
End: 2021-03-02

## 2021-03-02 DIAGNOSIS — T17.308A CHOKING, INITIAL ENCOUNTER: Primary | ICD-10-CM

## 2021-03-02 NOTE — TELEPHONE ENCOUNTER
Caller: Marsha Almeida    Relationship to patient: Emergency Contact    Best call back number: [phone]    Patient is needing: Marsha is concerned about her brother. He had a choking episode last night, Heimlich had to be used.    Attn:  Mana Reyan   Please call Marsha poole

## 2021-03-04 RX ORDER — DIAPER,BRIEF,INFANT-TODD,DISP
EACH MISCELLANEOUS
Qty: 28 G | Refills: 11 | Status: SHIPPED | OUTPATIENT
Start: 2021-03-04 | End: 2022-04-18

## 2021-04-01 ENCOUNTER — TELEPHONE (OUTPATIENT)
Dept: FAMILY MEDICINE CLINIC | Facility: CLINIC | Age: 56
End: 2021-04-01

## 2021-04-01 NOTE — TELEPHONE ENCOUNTER
Caller: Jose Luis Almeida    Relationship: Emergency Contact    Best call back number:[phone] (H)    What is the best time to reach you: ANYTIME    Who are you requesting to speak with (clinical staff, provider,  specific staff member): ALEXANDRA SOTELO OR KATIE HENRY    Do you know the name of the person who called: JOSE LUIS ALMEIDA     What was the call regarding: PATIENTS SISTER CALLED IN STATING PATIENT HAS GONE THROUGH SOME MAJOR CHANGES. STATED THAT LATELY HE HAS BEEN ACTING OUT, (SPITTING, HITTING,YELLING) AT PEOPLE. PATIENTS SISTER WANTED TO KNOW IF PATIENT SHOULD BE GIVEN SOME TYPE OF MEDICATION TO TAKE THE EDGE OFF UNTIL HE CAN GET THROUGH THE CHANGES HE IS GOING THROUGH, OR IF THERE ARE ANY OTHER SUGGESTIONS.  PLEASE ADVISE. THANK YOU. PATIENTS SISTER JOSE LUIS IS LISTED ON  VERBAL    Do you require a callback: YES

## 2021-04-02 ENCOUNTER — TELEPHONE (OUTPATIENT)
Dept: FAMILY MEDICINE CLINIC | Facility: CLINIC | Age: 56
End: 2021-04-02

## 2021-04-02 ENCOUNTER — TELEMEDICINE (OUTPATIENT)
Dept: FAMILY MEDICINE CLINIC | Facility: CLINIC | Age: 56
End: 2021-04-02

## 2021-04-02 DIAGNOSIS — R45.1 AGITATION: Primary | ICD-10-CM

## 2021-04-02 LAB
BILIRUB BLD-MCNC: NEGATIVE MG/DL
CLARITY, POC: CLEAR
COLOR UR: YELLOW
GLUCOSE UR STRIP-MCNC: NEGATIVE MG/DL
KETONES UR QL: ABNORMAL
LEUKOCYTE EST, POC: NEGATIVE
NITRITE UR-MCNC: NEGATIVE MG/ML
PH UR: 6 [PH] (ref 5–8)
PROT UR STRIP-MCNC: NEGATIVE MG/DL
RBC # UR STRIP: NEGATIVE /UL
SP GR UR: 1.01 (ref 1–1.03)
UROBILINOGEN UR QL: NORMAL

## 2021-04-02 PROCEDURE — 81002 URINALYSIS NONAUTO W/O SCOPE: CPT | Performed by: NURSE PRACTITIONER

## 2021-04-02 PROCEDURE — 99213 OFFICE O/P EST LOW 20 MIN: CPT | Performed by: NURSE PRACTITIONER

## 2021-04-02 NOTE — PROGRESS NOTES
Subjective   Suresh Alcantra is a 56 y.o. male.     History of Present Illness   Televisit with his current sitter Kerwin who is very familiar with and his Sister Marsha.  Is having some issues acting out. He lives in an apartment and has 24 hr a day care takers. His main help is Kerwin.  He has had a lot of changes and he has been having some episodes where he is not handling himself the way he normally does.  He has also had to share his apartment with a new tenant.  The following portions of the patient's history were reviewed and updated as appropriate: allergies, current medications, past family history, past medical history, past social history, past surgical history and problem list.    Review of Systems   Constitutional: Positive for activity change. Negative for appetite change.   Psychiatric/Behavioral: Positive for agitation and behavioral problems.       Objective   Physical Exam  Neurological:      General: No focal deficit present.      Mental Status: He is alert.         Assessment/Plan   Diagnoses and all orders for this visit:    1. Agitation (Primary)  -     POCT urinalysis dipstick, manual  -     Urine Culture - Urine, Urine, Clean Catch       Had a discussion with Kerwin's caregiver and Marsha his sister.  Suggested may be a small dose of Valium once daily.  Kerwin thought maybe talking to a therapist would help more.  Will check back with him next week and see how things are going  You have chosen to receive care through a telephone visit. Do you consent to use a telephone visit for your medical care today? Yes

## 2021-04-02 NOTE — TELEPHONE ENCOUNTER
Caller: MUNA    Relationship to patient:     Best call back number: [phone]    Patient is needing:  MUNA  THE CAREGIVER CALLING IN REGARDS TO PATIENT BEING UNABLE TO COLLECT URINE SAMPLE IN URINAL MUNA WOULD LIKE TO KNOW IF SHE WOULD BE ABLE TO COME  HAT TO PLACE ON SEAT FOR PATIENT        PLEASE ADVISE

## 2021-04-04 LAB
BACTERIA UR CULT: NORMAL
BACTERIA UR CULT: NORMAL

## 2021-04-07 NOTE — PATIENT INSTRUCTIONS
Social Anxiety Disorder, Adult  Social anxiety disorder (SAD), previously called social phobia, is a mental health condition. People with SAD often feel nervous, afraid, or embarrassed when they are around other people in social situations. They worry that other people are judging or criticizing them for how they look, what they say, or how they act.  SAD involves more than just feeling shy or self-conscious at times. It can cause severe emotional distress. It can interfere with activities of daily life. SAD also may lead to alcohol or drug use, and even suicide.  SAD is a common mental health condition. It can develop at any time, but it usually starts in the teenage years.  What are the causes?  The cause of this condition is not known. It may involve genes that are passed through families. Stressful events may trigger anxiety. This disorder is also associated with an overactive amygdala. The amygdala is the part of the brain that triggers your response to strong feelings, such as fear.  What increases the risk?  This condition is more likely to develop in:  · People who have a family history of anxiety disorders.  · Women.  · People who have a physical or behavioral condition that makes them feel self-conscious or nervous, such as a stutter or a long-term (chronic) disease.  What are the signs or symptoms?  The main symptom of this condition is fear of embarrassment caused by being criticized or judged in social situations. You may be afraid to:  · Speak in public.  · Go shopping.  · Use a public bathroom.  · Eat at a restaurant.  · Go to work.  · Interact with people you do not know.  Extreme fear and anxiety may cause physical symptoms, including:  · Blushing.  · A fast heartbeat.  · Sweating.  · Shaky hands or voice.  · Confusion.  · Light-headedness.  · Upset stomach, diarrhea, or vomiting.  · Shortness of breath.  How is this diagnosed?  This condition is diagnosed based on your history, symptoms, and  behavior in social situations. You may be diagnosed with this type of anxiety if your symptoms have lasted for more than 6 months and have been present on more days than not.  Your health care provider may ask you about your use of alcohol, drugs, and prescription medicines. He or she may also refer you to a mental health specialist for further evaluation or treatment.  How is this treated?  Treatment for this condition may include:  · Cognitive behavioral therapy (CBT). This type of talk therapy helps you learn to replace negative thoughts and behaviors with positive ones. This may include learning how to use self-calming skills and other methods of managing your anxiety.  · Exposure therapy. You will be exposed to social situations that cause you fear. The treatment starts with practicing self-calming in situations that cause you low levels of fear. Over time, you will progress by sustaining self-calming and managing harder situations.  · Antidepressant medicines. These medicines may be used by themselves or in addition to other therapies.  · Biofeedback. This process trains you to manage your body's response (physiological response) through breathing techniques and relaxation methods. You will work with a therapist while machines are used to monitor your physical symptoms.  · Techniques for relaxation and managing anxiety. These include deep breathing, self-talk, meditation, visual imagery, muscle relaxation, music therapy, and yoga. These techniques are often used with other therapies to keep you calm in situations that cause you anxiety.  These treatments are often used in combination.  Follow these instructions at home:  Alcohol use  If you drink alcohol:  · Limit how much you use to:  ? 0-1 drink a day for nonpregnant women.  ? 0-2 drinks a day for men.  · Be aware of how much alcohol is in your drink. In the [place] one drink equals one 12 oz bottle of beer (355 mL), one 5 oz glass of wine (148 mL), or one 1½  oz glass of hard liquor (44 mL).  General instructions  · Take over-the-counter and prescription medicines only as told by your health care provider.  · Practice techniques for relaxation and managing anxiety at times you are not challenged by social anxiety.  · Return to social activities using techniques you have learned, as you feel ready to do so.  · Avoid caffeine and certain over-the-counter cold medicines. These may make you feel worse. Ask your pharmacists which medicines to avoid.  · Keep all follow-up visits as told by your health care provider. This is important.  Where to find more information  · National [place] on Mental Illness (ERYN): https://www.eryn.org  · Social Anxiety Association: https://socialphobia.org  · Mental Health Judy (MHA): https://www.mhanational.org  · Anxiety and Depression Association of [place] (ADAA): https://adaa.org/  Contact a health care provider if:  · Your symptoms do not improve or get worse.  · You have signs of depression, such as:  ? Persistent sadness or moodiness.  ? Loss of enjoyment in activities that used to bring you willy.  ? Change in weight or eating.  ? Changes in sleeping habits.  ? Avoiding friends or family members more than usual.  ? Loss of energy for normal tasks.  ? Feeling guilty or worthless.  · You become more isolated than you normally are.  · You find it more and more difficult to speak or interact with others.  · You are using drugs.  · You are drinking more alcohol than usual.  Get help right away if:  · You harm yourself.  · You have suicidal thoughts.  If you ever feel like you may hurt yourself or others, or have thoughts about taking your own life, get help right away. You can go to your nearest emergency department or call:  · Your local emergency services (911 in the [place]  · A suicide crisis helpline, such as the National Suicide Prevention Lifeline at 1-324.552.3051. This is open 24 hours a day.  Summary  · Social anxiety disorder  (SAD) may cause you to feel nervous, afraid, or embarrassed when you are around other people in social situations.  · SAD is a common mental disorder. It can develop at any time, but it usually starts in the teenage years.  · Treatment includes talk therapy, exposure therapy, medicines, biofeedback, and relaxation techniques. It can involve a combination of treatments.  This information is not intended to replace advice given to you by your health care provider. Make sure you discuss any questions you have with your health care provider.  Document Revised: 05/20/2020 Document Reviewed: 05/20/2020  Elsevier Patient Education © 2021 Elsevier Inc.

## 2021-04-09 RX ORDER — OMEPRAZOLE 40 MG/1
CAPSULE, DELAYED RELEASE ORAL
Qty: 35 CAPSULE | Refills: 2 | Status: SHIPPED | OUTPATIENT
Start: 2021-04-09 | End: 2021-07-01

## 2021-04-09 RX ORDER — BACLOFEN 20 MG/1
TABLET ORAL
Qty: 120 TABLET | Refills: 1 | Status: SHIPPED | OUTPATIENT
Start: 2021-04-09 | End: 2021-06-04

## 2021-06-01 RX ORDER — DIMETHICONE, CAMPHOR (SYNTHETIC), MENTHOL, AND PHENOL 1.1; .5; .625; .5 G/100G; G/100G; G/100G; G/100G
OINTMENT TOPICAL
Qty: 7 EACH | Refills: 11 | Status: SHIPPED | OUTPATIENT
Start: 2021-06-01 | End: 2022-04-18

## 2021-06-04 RX ORDER — CETIRIZINE HYDROCHLORIDE 10 MG/1
10 TABLET ORAL DAILY
Qty: 259 TABLET | Refills: 2 | Status: SHIPPED | OUTPATIENT
Start: 2021-06-04 | End: 2022-04-18 | Stop reason: ALTCHOICE

## 2021-06-04 RX ORDER — MELATONIN 10 MG
1 TABLET, SUBLINGUAL SUBLINGUAL DAILY
Qty: 265 CAPSULE | Refills: 2 | Status: SHIPPED | OUTPATIENT
Start: 2021-06-04

## 2021-06-04 RX ORDER — BACLOFEN 20 MG/1
TABLET ORAL
Qty: 120 TABLET | Refills: 1 | Status: SHIPPED | OUTPATIENT
Start: 2021-06-04 | End: 2021-07-30

## 2021-07-01 RX ORDER — OMEPRAZOLE 40 MG/1
CAPSULE, DELAYED RELEASE ORAL
Qty: 35 CAPSULE | Refills: 2 | Status: SHIPPED | OUTPATIENT
Start: 2021-07-01 | End: 2021-09-24

## 2021-07-30 ENCOUNTER — OFFICE VISIT (OUTPATIENT)
Dept: FAMILY MEDICINE CLINIC | Facility: CLINIC | Age: 56
End: 2021-07-30

## 2021-07-30 VITALS — RESPIRATION RATE: 16 BRPM | HEART RATE: 90 BPM | OXYGEN SATURATION: 94 %

## 2021-07-30 DIAGNOSIS — L98.9 SKIN LESION OF BACK: Primary | ICD-10-CM

## 2021-07-30 PROCEDURE — 99213 OFFICE O/P EST LOW 20 MIN: CPT | Performed by: NURSE PRACTITIONER

## 2021-07-30 RX ORDER — DOCUSATE SODIUM 100 MG/1
CAPSULE, LIQUID FILLED ORAL
Qty: 63 CAPSULE | Refills: 3 | Status: SHIPPED | OUTPATIENT
Start: 2021-07-30 | End: 2022-04-07

## 2021-07-30 RX ORDER — BACLOFEN 20 MG/1
TABLET ORAL
Qty: 120 TABLET | Refills: 3 | Status: SHIPPED | OUTPATIENT
Start: 2021-07-30 | End: 2021-11-19

## 2021-07-30 RX ORDER — IBUPROFEN 200 MG
TABLET ORAL 3 TIMES DAILY PRN
Qty: 28 G | Refills: 0 | Status: SHIPPED | OUTPATIENT
Start: 2021-07-30

## 2021-07-30 NOTE — PROGRESS NOTES
Subjective   Suresh Alcantar is a 56 y.o. male.     History of Present Illness   Patient presents with c/o rash to upper back. Patient's caregiver reports that patient reports pain when he touched it. Patient reports that pain started on Monday. Caregiver reports that he just saw it today. Patient reports that rash is itchy.   The following portions of the patient's history were reviewed and updated as appropriate: allergies, current medications, past family history, past medical history, past social history, past surgical history and problem list.    Review of Systems   Constitutional: Negative for chills, fatigue and fever.   Respiratory: Negative for cough, chest tightness, shortness of breath and wheezing.    Cardiovascular: Negative for chest pain, palpitations and leg swelling.   Musculoskeletal: Negative for arthralgias and joint swelling.   Skin: Positive for rash. Negative for color change, dry skin, pallor, skin lesions and bruise.   Allergic/Immunologic: Negative.    Neurological: Negative for dizziness, weakness and headache.       Objective   Physical Exam  Vitals and nursing note reviewed.   Constitutional:       Appearance: He is well-developed.   HENT:      Head: Normocephalic and atraumatic.   Eyes:      Conjunctiva/sclera: Conjunctivae normal.      Pupils: Pupils are equal, round, and reactive to light.   Cardiovascular:      Rate and Rhythm: Normal rate and regular rhythm.      Heart sounds: Normal heart sounds. No murmur heard.     Pulmonary:      Effort: Pulmonary effort is normal.      Breath sounds: Normal breath sounds.   Musculoskeletal:         General: No deformity.      Cervical back: Normal range of motion and neck supple.   Lymphadenopathy:      Cervical: No cervical adenopathy.   Skin:     General: Skin is warm and dry.      Findings: Erythema and lesion present. No rash.   Neurological:      Mental Status: He is alert and oriented to person, place, and time.   Psychiatric:          Behavior: Behavior normal.         Thought Content: Thought content normal.         Judgment: Judgment normal.         Vitals:    07/30/21 1354   Pulse: 90   Resp: 16   SpO2: 94%     There is no height or weight on file to calculate BMI.    Procedures    Assessment/Plan   Problems Addressed this Visit     None      Visit Diagnoses     Skin lesion of back    -  Primary    Relevant Medications    neomycin-bacitracin-polymyxin (NEOSPORIN) 5-400-5000 ointment      Diagnoses       Codes Comments    Skin lesion of back    -  Primary ICD-10-CM: L98.9  ICD-9-CM: 709.9         Keep site clean and dry.  Apply sterile bandage to site after applying triple antibiotic ointment X 7 days.   Use foam pad or pillow to help prevent chair rubbing back.          Return if symptoms worsen or fail to improve.

## 2021-07-30 NOTE — PATIENT INSTRUCTIONS
Keep site clean and dry.  Apply sterile bandage to site after applying triple antibiotic ointment X 7 days.   Use foam pad or pillow to help prevent chair rubbing back.   Return if symptoms worsen or fail to improve.

## 2021-08-19 ENCOUNTER — TELEPHONE (OUTPATIENT)
Dept: FAMILY MEDICINE CLINIC | Facility: CLINIC | Age: 56
End: 2021-08-19

## 2021-08-23 ENCOUNTER — OFFICE VISIT (OUTPATIENT)
Dept: FAMILY MEDICINE CLINIC | Facility: CLINIC | Age: 56
End: 2021-08-23

## 2021-08-23 DIAGNOSIS — Z20.822 EXPOSURE TO COVID-19 VIRUS: Primary | ICD-10-CM

## 2021-08-23 PROCEDURE — 99213 OFFICE O/P EST LOW 20 MIN: CPT | Performed by: NURSE PRACTITIONER

## 2021-08-24 LAB
LABCORP SARS-COV-2, NAA 2 DAY TAT: NORMAL
SARS-COV-2 RNA RESP QL NAA+PROBE: NOT DETECTED

## 2021-08-24 NOTE — PATIENT INSTRUCTIONS
COVID-19: What Your Test Results Mean  If you test positive for COVID-19  Take steps to help prevent the spread of COVID-19  Stay home.   Do not leave your home, except to get medical care. Do not visit public areas.  Get rest and stay hydrated.  Take over-the-counter medicines, such as acetaminophen, to help you feel better.  Stay in touch with your doctor.  Separate yourself from other people.   As much as possible, stay in a specific room and away from other people and pets in your home.  If you test negative for COVID-19  · You probably were not infected at the time your sample was collected.  · However, that does not mean you will not get sick.  · It is possible that you were very early in your infection when your sample was collected and that you could test positive later.  A negative test result does not mean you won't get sick later.  cdc.gov/coronavirus  05/30/2020  This information is not intended to replace advice given to you by your health care provider. Make sure you discuss any questions you have with your health care provider.  Document Revised: 04/14/2021 Document Reviewed: 04/14/2021  Elsevier Patient Education © 2021 Elsevier Inc.

## 2021-08-24 NOTE — PROGRESS NOTES
Subjective   Suresh Alcantar is a 56 y.o. male.     History of Present Illness  Carlos A had a visitor last week and they spoke for a couple of hours and they were both fully vaccinated but were not wearing masks. He was informed that the visitor had been around someone who tested positive for covid. He has no symptoms but would like a screening test.  The following portions of the patient's history were reviewed and updated as appropriate: allergies, current medications, past family history, past medical history, past social history, past surgical history and problem list.    Review of Systems   Constitutional: Negative for activity change, appetite change, fatigue and fever.   HENT: Negative for sinus pain and sore throat.    Respiratory: Negative for cough and shortness of breath.    Neurological: Negative for headaches.       Objective   Physical Exam  Vitals and nursing note reviewed.   Constitutional:       General: He is not in acute distress.     Appearance: He is well-developed.   HENT:      Head: Normocephalic and atraumatic.   Eyes:      Pupils: Pupils are equal, round, and reactive to light.   Pulmonary:      Effort: Pulmonary effort is normal.   Musculoskeletal:         General: Normal range of motion.   Lymphadenopathy:      Cervical: No cervical adenopathy.   Skin:     General: Skin is warm and dry.   Neurological:      Mental Status: He is alert and oriented to person, place, and time.         Assessment/Plan   Diagnoses and all orders for this visit:    1. Exposure to COVID-19 virus (Primary)  -     COVID-19,[facility] ROUTINE, NP/OP SWAB IN TRANSPORT MEDIA OR ESWAB 72 HR TAT - Swab, Oropharynx     Pt.was transported to the visit by the company that assists him and I went out to speak with him.He was in no distress and had no symptoms. Will call with his covid results.

## 2021-09-24 RX ORDER — OMEPRAZOLE 40 MG/1
CAPSULE, DELAYED RELEASE ORAL
Qty: 90 CAPSULE | Refills: 2 | Status: SHIPPED | OUTPATIENT
Start: 2021-09-24 | End: 2022-05-31

## 2021-10-21 RX ORDER — OXYBUTYNIN CHLORIDE 5 MG/1
TABLET, EXTENDED RELEASE ORAL
Qty: 63 TABLET | Refills: 3 | Status: SHIPPED | OUTPATIENT
Start: 2021-10-21 | End: 2022-05-31

## 2021-11-10 ENCOUNTER — TELEPHONE (OUTPATIENT)
Dept: FAMILY MEDICINE CLINIC | Facility: CLINIC | Age: 56
End: 2021-11-10

## 2021-11-10 NOTE — TELEPHONE ENCOUNTER
Unable to reach anyone at call back number.  Want to question the exposure.  No mask , less than 6 feet apart for more than 20 mins.  If so should get tested in 3-5 days

## 2021-11-10 NOTE — TELEPHONE ENCOUNTER
Caller: RANJIT     Relationship: CARE GIVER    Best call back number: [phone]    What is the best time to reach you: ANY TIME    Who are you requesting to speak with (clinical staff, provider,  specific staff member): CLINICAL    What was the call regarding: PATIENT WAS EXPOSED TO COVID ON Monday 11/8/2021 AND IS NOT CURRENTLY HAVING ANY SYMPTOMS. HIS CARE GIVER CALLED TO SEE WHEN OR IF HE SHOULD GET TESTED.     Do you require a callback: YES

## 2021-11-19 RX ORDER — BACLOFEN 20 MG/1
TABLET ORAL
Qty: 120 TABLET | Refills: 3 | Status: SHIPPED | OUTPATIENT
Start: 2021-11-19 | End: 2022-03-08

## 2021-12-02 ENCOUNTER — TELEPHONE (OUTPATIENT)
Dept: FAMILY MEDICINE CLINIC | Facility: CLINIC | Age: 56
End: 2021-12-02

## 2021-12-02 NOTE — TELEPHONE ENCOUNTER
Caller: Marsha Almeida    Relationship: Emergency Contact    Best call back number: [phone]    SISTER STATES THAT SHE FAXED OVER PAPERWORK THAT WAS REGARDING PATIENTS HOUSING WITH [employer] ON 11/18.     SISTER STATES THAT SHE FOUND OUT [employer] NEVER RECEIVED A FAX FROM [facility] OFFICE.     PLEASE ADVISE ON THIS.

## 2021-12-08 NOTE — TELEPHONE ENCOUNTER
THE PATIENT'S EMERGENCY CONTACT CALLED BACK IN FOR AN UPDATE. SHE WANTED TO LET ALEXANDRA AND DR. ZUÑIGA KNOW THAT THE PATIENT DROPPED OFF SOME PAPERWORK TO THE OFFICE REGARDING HIS HOUSING. THE CONTACT WANTED TO BE SURE THAT CLINICAL STAFF RECEIVED THIS. SHE WOULD LIKE A CALL BACK [phone] TO FOLLOW-UP, SHE STATES THAT THIS IS A TIME SENSITIVE MATTER.

## 2021-12-09 ENCOUNTER — OFFICE VISIT (OUTPATIENT)
Dept: FAMILY MEDICINE CLINIC | Facility: CLINIC | Age: 56
End: 2021-12-09

## 2021-12-09 VITALS
WEIGHT: 140 LBS | HEART RATE: 104 BPM | DIASTOLIC BLOOD PRESSURE: 76 MMHG | HEIGHT: 64 IN | SYSTOLIC BLOOD PRESSURE: 144 MMHG | BODY MASS INDEX: 23.9 KG/M2 | RESPIRATION RATE: 18 BRPM | OXYGEN SATURATION: 94 %

## 2021-12-09 DIAGNOSIS — Z23 NEED FOR VACCINATION: ICD-10-CM

## 2021-12-09 DIAGNOSIS — G80.1 SPASTIC DIPLEGIC CEREBRAL PALSY (HCC): Primary | Chronic | ICD-10-CM

## 2021-12-09 PROCEDURE — 99213 OFFICE O/P EST LOW 20 MIN: CPT | Performed by: FAMILY MEDICINE

## 2021-12-09 PROCEDURE — 91300 COVID-19 (PFIZER): CPT | Performed by: FAMILY MEDICINE

## 2021-12-09 PROCEDURE — 0003A COVID-19 (PFIZER): CPT | Performed by: FAMILY MEDICINE

## 2021-12-09 NOTE — PROGRESS NOTES
Subjective   Suresh Alcantar is a 56 y.o. male.   Immobility    History of Present Illness   Carlos A here today for wheelchair evaluation.  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  He clearly has a mobility limitation that significantly impairs his ability to participate in almost all mobility related activities of daily living including toileting, feeding, dressing, grooming and bathing in his home.  His mobility limitation cannot be sufficiently resolved using an appropriate fitted cane or walker because he does not have the appropriate upper body strength to manipulate a walker and he certainly does not have enough lower extremity strength to stand.  He has enough upper extremity strength and coordination to propel a light weight wheelchair for about 3 to 5 feet.  He requires full assistance when out of his home.  The use of a manual lightweight wheelchair by his caregivers will significantly improve his life and his ability to participate in limited mobility related activities of daily living in his home.  He could also use this equipment in a regular basis in his home.  He has not expressed an unwillingness to use a manual wheelchair extent that he can.  He has limited but sufficient upper extremity function to propel himself 3 to 5 feet in his home.  He most certainly has a mental capability needed to safely self propel himself in the wheelchair on a limited amount.  He has a beneficiary of a caregiver who is available willing and able to provide assistance with the wheelchair when he cannot propel it.    The following portions of the patient's history were reviewed and updated as appropriate: allergies, current medications, past family history, past medical history, past social history, past surgical history and problem list.    Review of Systems   HENT:  Negative.    Eyes: Negative.    Respiratory: Negative.    Cardiovascular: Negative.    Genitourinary: Negative.    Musculoskeletal: Positive for arthralgias.   Skin: Negative.    Neurological: Positive for speech difficulty and weakness.   Psychiatric/Behavioral: Negative.        Objective   Physical Exam  Vitals and nursing note reviewed.   Constitutional:       General: He is not in acute distress.     Appearance: He is well-developed.      Comments: In a light weight wheelchair.  Accompanied by caregiver.   HENT:      Head: Normocephalic and atraumatic.      Right Ear: External ear normal.      Left Ear: External ear normal.   Eyes:      Pupils: Pupils are equal, round, and reactive to light.   Cardiovascular:      Rate and Rhythm: Normal rate and regular rhythm.   Pulmonary:      Effort: Pulmonary effort is normal.      Breath sounds: Normal breath sounds.   Musculoskeletal:      Cervical back: Neck supple.      Comments: Significant flexure deformities in upper and lower limbs.  Decreased muscle tone in legs and upper arms.  Spasticity in all extremities   Lymphadenopathy:      Cervical: No cervical adenopathy.   Skin:     General: Skin is warm and dry.   Neurological:      Mental Status: He is alert and oriented to person, place, and time.      Motor: Weakness present.      Coordination: Coordination abnormal.      Comments: Unable to ambulate   Psychiatric:         Mood and Affect: Mood normal.         Behavior: Behavior normal.         Thought Content: Thought content normal.         Judgment: Judgment normal.           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  I have also included support in this note for Carlos A to get a new lightweight wheelchair.      Relevant Orders    Ambulatory Referral to Physical Therapy Evaluate and treat (Completed)      Other Visit  Diagnoses     Need for vaccination        Relevant Orders    COVID-19 Vaccine (Pfizer) (Completed)               Return in about 2 months (around 2/9/2022) for Annual physical.

## 2021-12-13 ENCOUNTER — TELEPHONE (OUTPATIENT)
Dept: FAMILY MEDICINE CLINIC | Facility: CLINIC | Age: 56
End: 2021-12-13

## 2021-12-13 NOTE — TELEPHONE ENCOUNTER
Caller: FRIDA    Relationship to patient: [facility]    Best call back number: [phone]    Patient is needing: FRIDA IS CALLING TO STATE SHE HAS BEEN WORKING TO OBTAIN A WHEELCHAIR FOR PATIENT.  SHE IS CALLING TO ASK IF THE NEED FOR A WHEELCHAIR WAS ADDRESSED AT PATIENT'S LAST APPOINTMENT.  SHE STATES IF IT WAS DISCUSSED, WILL NEED A COPY OF THAT OFFICE VISIT.    PLEASE ADVISE.      FAX#[phone]

## 2021-12-28 NOTE — TELEPHONE ENCOUNTER
SHE GOT THE FAX BUT SOME OF THE INFORMATION IS MISSING. SHE NEEDS IT TO BE COMPLETED AND SENT BACK. ITS SECTION B OF THE FORM.

## 2022-01-19 NOTE — TELEPHONE ENCOUNTER
Caller: [facility]     Relationship to patient: FRIDA     Best call back number: [phone]  [phone]    Patient is needing: FRIDA IS CALLING IN REGARDS TO THE PAPERWORK THAT WAS FAXED OVER ON 01/13 IN REGARDS TO MEDICAL NECESSITY FOR MANUAL WHEELCHAIR. SHE STATED THAT THERE ARE DATES THAT ARE MISSING ON THE FIRST PAGE. PLEASE CALL WHEN POSSIBLE TO VERIFY THAT IT WAS RECEIVED

## 2022-02-18 ENCOUNTER — OFFICE VISIT (OUTPATIENT)
Dept: FAMILY MEDICINE CLINIC | Facility: CLINIC | Age: 57
End: 2022-02-18

## 2022-02-18 VITALS
OXYGEN SATURATION: 98 % | HEIGHT: 64 IN | RESPIRATION RATE: 16 BRPM | HEART RATE: 88 BPM | WEIGHT: 136 LBS | DIASTOLIC BLOOD PRESSURE: 70 MMHG | BODY MASS INDEX: 23.22 KG/M2 | SYSTOLIC BLOOD PRESSURE: 124 MMHG

## 2022-02-18 DIAGNOSIS — G80.0 SPASTIC QUADRIPLEGIC CEREBRAL PALSY: ICD-10-CM

## 2022-02-18 DIAGNOSIS — R13.11 ORAL PHASE DYSPHAGIA: ICD-10-CM

## 2022-02-18 DIAGNOSIS — Z00.00 MEDICARE ANNUAL WELLNESS VISIT, SUBSEQUENT: Primary | ICD-10-CM

## 2022-02-18 PROCEDURE — G0439 PPPS, SUBSEQ VISIT: HCPCS | Performed by: FAMILY MEDICINE

## 2022-02-18 PROCEDURE — 1160F RVW MEDS BY RX/DR IN RCRD: CPT | Performed by: FAMILY MEDICINE

## 2022-02-18 PROCEDURE — 1170F FXNL STATUS ASSESSED: CPT | Performed by: FAMILY MEDICINE

## 2022-02-18 NOTE — PROGRESS NOTES
Medicare Subsequent Wellness Visit  Subjective   History of Present Illness    Suresh Alcantar is a 57 y.o. male who presents for an Medicare Wellness Visit. In addition, we addressed the following health issues:  He has a hx of CP and is in a motorized chair. He is here today with an assitant who moitors his care/  Carlos A is living in an independent apartment. He has a roommate, Parish, who helps care for him.He goes to [employer] and has a very active social life. He medical issues are stable. He is clean, skin is in good condition and he is very happy.  She states that the family is concerned that he is choking on water.  Caregiver states that he is not having any other choking issues.  The family would like for him to have a thickener for water but he has not yet had a swallow study.  For reasons not explained to me the family is opposed to the idea of a swallow study.  I explained to the caregiver that adding of thickener to water and then adding into a cup where Carlos A has to use a straw may make it almost impossible for him to actually drink water through a straw that thickened.  I have asked the care giver to review these concerns with his family and encouraged them to consider a swallow study for the future.  I have also stated that if he is not having any trouble drinking any other liquids that is perfectly fine for him to have lemonade, juice, milk and he does not have to drink plain water if that looks causing him to choke.  PMH, PSH, SocHx, FamHx, Allergies, and Medications: Reviewed and updated in the history section of chart.  Family History   Problem Relation Age of Onset   • Diabetes Mother    • Thyroid disease Mother    • Hypertension Mother    • Diabetes Father    • Diabetes Sister    • Uterine cancer Sister    • Throat cancer Sister    • Rectal cancer Sister    • Rheum arthritis Sister    • Hypertension Sister    • Diabetes Brother    • Hypertension Brother    • Cancer Paternal Aunt         Bladder.    • Colon cancer Paternal Uncle    • Heart disease Maternal Grandmother    • Heart attack Maternal Grandmother        Social History     Social History Narrative    Lives at [facility]       No Known Allergies    Outpatient Medications Prior to Visit   Medication Sig Dispense Refill   • acetaminophen (TYLENOL) 500 MG tablet Take 1 tablet by mouth Every 6 (Six) Hours As Needed for mild pain (1-3). 90 tablet 3   • baclofen (LIORESAL) 20 MG tablet TAKE ONE TABLET BY MOUTH FOUR TIMES DAILY 120 tablet 3   • Calcium Antacid 500 MG chewable tablet CHEW ONE TABLET THREE TIMES DAILY AS NEEDED FOR STOMACH 30 tablet 10   • cetirizine (zyrTEC) 10 MG tablet Take 1 tablet by mouth Daily. 259 tablet 2   • Cold Sore Products (Blistex ointment) ointment APPLY TOPICALLY AS NEEDED FOR DRY LIPS 7 each 11   • guaiFENesin 200 MG tablet TAKE ONE TABLET BY MOUTH EVERY 6 HOURS AS NEEDED FOR COUGH 28 tablet 0   • hydrocortisone 1 % cream APPLY TOPICALLY TO THE TO THE AFFECTED AREA four TIMES daily AS NEEDED 28 g 11   • Hydrocortisone-Aloe Vera (GNP HYDROCORTISONE/ALOE) 1 % cream Apply 1 application topically to the appropriate area as directed 4 (Four) Times a Day As Needed (skin irritation). 28.4 g 6   • ibuprofen (ADVIL,MOTRIN) 200 MG tablet TAKE ONE TABLET BY MOUTH EVERY 8 HOURS AS NEEDED FOR mild PAIN 28 tablet 11   • neomycin-bacitracin-polymyxin (NEOSPORIN) 5-400-5000 ointment Apply  topically to the appropriate area as directed 3 (Three) Times a Day As Needed for Irritation. 28 g 0   • omeprazole (priLOSEC) 40 MG capsule TAKE ONE CAPSULE BY MOUTH AT BEDTIME 90 capsule 2   • oxybutynin XL (DITROPAN-XL) 5 MG 24 hr tablet TAKE ONE TABLET BY MOUTH AT BEDTIME 63 tablet 3   • Pediatric Multiple Vit-C-FA (multivitamin with C  & folic acid) with C & FA chewable tablet chewable tablet TAKE ONE TABLET BY MOUTH DAILY 36 tablet 11   • Probiotic Product (Advanced Probiotic) capsule Take 1 capsule by mouth Daily. 265 capsule 2   • Stool Softener  Laxative 100 MG capsule TAKE ONE CAPSULE BY MOUTH AT BEDTIME 63 capsule 3     No facility-administered medications prior to visit.        Patient Active Problem List   Diagnosis   • Spastic diplegic cerebral palsy (HCC)         Patient Care Team:  Naresh Gonzalez MD as PCP - General  Health Habits:  Current Diet: Well balanced diet  Dental Exam.  Up-to-date  Eye Exam.  Up-to-date  Exercise: He is in physical therapy    Recent Hospitalizations:  none    Age-appropriate Screening Schedule:  Refer to the list below for future screening recommendations based on patient's age. Orders for these recommended tests are listed in the plan section. The patient has been provided with a written plan.      Health Maintenance   Topic Date Due   • ZOSTER VACCINE (1 of 2) Never done   • LIPID PANEL  04/05/2020   • ANNUAL WELLNESS VISIT  02/18/2023   • TDAP/TD VACCINES (2 - Td or Tdap) 12/13/2026   • COLORECTAL CANCER SCREENING  12/13/2026   • HEPATITIS C SCREENING  Completed   • COVID-19 Vaccine  Completed   • INFLUENZA VACCINE  Completed   • Pneumococcal Vaccine 0-64  Aged Out       Depression Screen:   PHQ-2/PHQ-9 Depression Screening 2/18/2022   Little interest or pleasure in doing things 0   Feeling down, depressed, or hopeless 0   Total Score 0       Functional and Cognitive Screening:  Functional & Cognitive Status 2/18/2022   Do you have difficulty preparing food and eating? Yes   Do you have difficulty bathing yourself, getting dressed or grooming yourself? Yes   Do you have difficulty using the toilet? Yes   Do you have difficulty moving around from place to place? Yes   Do you have trouble with steps or getting out of a bed or a chair? Yes   Current Diet Well Balanced Diet   Dental Exam Not up to date   Eye Exam Not up to date   Exercise (times per week) 2 times per week   Current Exercises Include (No Data)        Exercise Comment Physical Therapy   Current Exercise Activities Include -   Do you need help using the  "phone?  Yes   Are you deaf or do you have serious difficulty hearing?  No   Do you need help with transportation? Yes   Do you need help shopping? Yes   Do you need help preparing meals?  Yes   Do you need help with housework?  Yes   Do you need help with laundry? Yes   Do you need help taking your medications? Yes   Do you need help managing money? Yes   Do you ever drive or ride in a car without wearing a seat belt? Yes   Have you felt unusual stress, anger or loneliness in the last month? No   Who do you live with? Other   If you need help, do you have trouble finding someone available to you? No   Have you been bothered in the last four weeks by sexual problems? No   Do you have difficulty concentrating, remembering or making decisions? Yes     Does the patient have evidence of cognitive impairment?  Mild    Compared to one year ago, the patient feels their physical health and mental health are the same.       Review of Systems   Constitutional: Negative for activity change, appetite change, fatigue and fever.   HENT: Negative for sinus pain and sore throat.    Respiratory: Negative for cough and shortness of breath.    Musculoskeletal:        Unable to ambulate or use both arms effectively.   Neurological: Positive for tremors, speech difficulty and weakness. Negative for headaches.   Psychiatric/Behavioral: Negative.        Objective     Vitals:    02/18/22 1437   BP: 124/70   Pulse: 88   Resp: 16   SpO2: 98%   Weight: 61.7 kg (136 lb)   Height: 162.6 cm (64\")       Body mass index is 23.34 kg/m².    PHYSICAL EXAM  Vitals reviewed and on chart.  HEENT: PERRLA, EOMI. Oral mucosa moist,   No LAD.  CV: RRR, no murmurs, rubs, clicks or gallops  LUNGS: CTA bilaterally  EXT: No edema, FROM in bilateral upper and lower ext  NEURO: CN II - XII grossly intact  PSYCH: good mood, positive affect, alert and engaged. No thoughts of self harm  expressed.    ASSESSMENT AND PLAN      Problem List Items Addressed This Visit  "    None      Visit Diagnoses     Medicare annual wellness visit, subsequent    -  Primary    Relevant Orders    Comprehensive Metabolic Panel    CBC (No Diff)    Spastic quadriplegic cerebral palsy (HCC)        Relevant Orders    Ambulatory Referral to Occupational Therapy (Completed)    Oral phase dysphagia        Relevant Orders    Ambulatory Referral to Speech Therapy (Completed)        Orders:  Orders Placed This Encounter   Procedures   • Comprehensive Metabolic Panel   • CBC (No Diff)   • Ambulatory Referral to Occupational Therapy   • Ambulatory Referral to Speech Therapy   Labs were ordered but Argenis was on able to comply with blood draw.  He got very upset about it and so we discontinued.  Considering a swallow test and will get back to me.   Follow Up:  Return in about 1 year (around 2/18/2023) for Annual physical.     ADVANCED DIRECTIVES:   ACP discussion was held with the patient during this visit. Patient has an advance directive in EMR which is still valid.     An After Visit Summary and PPPS with all of these plans were given to the patient.             Answers for HPI/ROS submitted by the patient on 1/13/2022  What is the primary reason for your visit?: Physical

## 2022-02-20 NOTE — PATIENT INSTRUCTIONS
Medicare Wellness  Personal Prevention Plan of Service     Date of Office Visit:  2022  Encounter Provider:  Naresh Gonzalez MD  Place of Service:  [facility]  Patient Name: Suresh Alcantar  :  1965    As part of the Medicare Wellness portion of your visit today, we are providing you with this personalized preventive plan of services (PPPS). This plan is based upon recommendations of the [place] Preventive Services Task Force (USPSTF) and the Advisory Committee on Immunization Practices (ACIP).    This lists the preventive care services that should be considered, and provides dates of when you are due. Items listed as completed are up-to-date and do not require any further intervention.    Health Maintenance   Topic Date Due   • ZOSTER VACCINE (1 of 2) Never done   • LIPID PANEL  2020   • ANNUAL WELLNESS VISIT  2023   • TDAP/TD VACCINES (2 - Td or Tdap) 2026   • COLORECTAL CANCER SCREENING  2026   • HEPATITIS C SCREENING  Completed   • COVID-19 Vaccine  Completed   • INFLUENZA VACCINE  Completed   • Pneumococcal Vaccine 0-64  Aged Out       Orders Placed This Encounter   Procedures   • Comprehensive Metabolic Panel     Order Specific Question:   Release to patient     Answer:   Immediate   • CBC (No Diff)     Order Specific Question:   Release to patient     Answer:   Immediate   • Ambulatory Referral to Occupational Therapy     Referral Priority:   Routine     Referral Type:   Occupational Therapy     Referral Reason:   Specialty Services Required     Requested Specialty:   Occupational Therapy     Number of Visits Requested:   1   • Ambulatory Referral to Speech Therapy     Referral Priority:   Routine     Referral Type:   Speech Pathology     Referral Reason:   Specialty Services Required     Requested Specialty:   Speech Pathology     Number of Visits Requested:   1       Return in about 1 year (around 2023) for Annual  physical.

## 2022-02-22 LAB
ALBUMIN SERPL-MCNC: 4.6 G/DL (ref 3.8–4.9)
ALBUMIN/GLOB SERPL: 1.8 {RATIO} (ref 1.2–2.2)
ALP SERPL-CCNC: 83 IU/L (ref 44–121)
ALT SERPL-CCNC: 16 IU/L (ref 0–44)
AST SERPL-CCNC: 22 IU/L (ref 0–40)
BILIRUB SERPL-MCNC: 0.3 MG/DL (ref 0–1.2)
BUN SERPL-MCNC: 15 MG/DL (ref 6–24)
BUN/CREAT SERPL: 20 (ref 9–20)
CALCIUM SERPL-MCNC: 9.8 MG/DL (ref 8.7–10.2)
CHLORIDE SERPL-SCNC: 100 MMOL/L (ref 96–106)
CO2 SERPL-SCNC: 23 MMOL/L (ref 20–29)
CREAT SERPL-MCNC: 0.75 MG/DL (ref 0.76–1.27)
ERYTHROCYTE [DISTWIDTH] IN BLOOD BY AUTOMATED COUNT: 12.3 % (ref 11.6–15.4)
GLOBULIN SER CALC-MCNC: 2.6 G/DL (ref 1.5–4.5)
GLUCOSE SERPL-MCNC: 98 MG/DL (ref 65–99)
HCT VFR BLD AUTO: 41.1 % (ref 37.5–51)
HGB BLD-MCNC: 13.6 G/DL (ref 13–17.7)
MCH RBC QN AUTO: 30 PG (ref 26.6–33)
MCHC RBC AUTO-ENTMCNC: 33.1 G/DL (ref 31.5–35.7)
MCV RBC AUTO: 91 FL (ref 79–97)
PLATELET # BLD AUTO: 272 X10E3/UL (ref 150–450)
POTASSIUM SERPL-SCNC: 4.8 MMOL/L (ref 3.5–5.2)
PROT SERPL-MCNC: 7.2 G/DL (ref 6–8.5)
RBC # BLD AUTO: 4.54 X10E6/UL (ref 4.14–5.8)
SODIUM SERPL-SCNC: 142 MMOL/L (ref 134–144)
WBC # BLD AUTO: 6.5 X10E3/UL (ref 3.4–10.8)

## 2022-03-08 RX ORDER — BACLOFEN 20 MG/1
TABLET ORAL
Qty: 120 TABLET | Refills: 1 | Status: SHIPPED | OUTPATIENT
Start: 2022-03-08 | End: 2022-05-04

## 2022-03-10 ENCOUNTER — TELEPHONE (OUTPATIENT)
Dept: FAMILY MEDICINE CLINIC | Facility: CLINIC | Age: 57
End: 2022-03-10

## 2022-03-10 DIAGNOSIS — G80.0 SPASTIC QUADRIPLEGIC CEREBRAL PALSY: Primary | ICD-10-CM

## 2022-03-10 NOTE — TELEPHONE ENCOUNTER
[facility] is requesting you enter order PT and OT through Home Health.  Need to fax a copy of the order to [phone]

## 2022-04-07 RX ORDER — DOCUSATE SODIUM 100 MG/1
CAPSULE, LIQUID FILLED ORAL
Qty: 63 CAPSULE | Refills: 3 | Status: SHIPPED | OUTPATIENT
Start: 2022-04-07 | End: 2022-12-01 | Stop reason: SDUPTHER

## 2022-04-12 ENCOUNTER — OUTSIDE FACILITY SERVICE (OUTPATIENT)
Dept: FAMILY MEDICINE CLINIC | Facility: CLINIC | Age: 57
End: 2022-04-12

## 2022-04-12 PROCEDURE — OUTSIDEPOS PR OUTSIDE POS PLACEHOLDER: Performed by: FAMILY MEDICINE

## 2022-04-18 ENCOUNTER — OFFICE VISIT (OUTPATIENT)
Dept: FAMILY MEDICINE CLINIC | Facility: CLINIC | Age: 57
End: 2022-04-18

## 2022-04-18 VITALS
HEIGHT: 69 IN | WEIGHT: 136 LBS | HEART RATE: 84 BPM | DIASTOLIC BLOOD PRESSURE: 90 MMHG | OXYGEN SATURATION: 99 % | SYSTOLIC BLOOD PRESSURE: 140 MMHG | BODY MASS INDEX: 20.14 KG/M2

## 2022-04-18 DIAGNOSIS — J30.2 SEASONAL ALLERGIES: Primary | ICD-10-CM

## 2022-04-18 DIAGNOSIS — R05.9 COUGH: ICD-10-CM

## 2022-04-18 DIAGNOSIS — J06.9 UPPER RESPIRATORY TRACT INFECTION, UNSPECIFIED TYPE: ICD-10-CM

## 2022-04-18 PROCEDURE — 99213 OFFICE O/P EST LOW 20 MIN: CPT | Performed by: NURSE PRACTITIONER

## 2022-04-18 RX ORDER — OXYMETAZOLINE HYDROCHLORIDE 0.05 G/100ML
2 SPRAY NASAL 2 TIMES DAILY
COMMUNITY

## 2022-04-18 RX ORDER — AZITHROMYCIN 250 MG/1
TABLET, FILM COATED ORAL
Qty: 6 TABLET | Refills: 0 | Status: SHIPPED | OUTPATIENT
Start: 2022-04-18 | End: 2022-09-01

## 2022-04-18 RX ORDER — GUAIFENESIN 200 MG/1
200 TABLET ORAL EVERY 6 HOURS PRN
Qty: 28 TABLET | Refills: 2 | Status: SHIPPED | OUTPATIENT
Start: 2022-04-18 | End: 2022-09-01

## 2022-04-18 RX ORDER — FEXOFENADINE HCL 180 MG/1
180 TABLET ORAL DAILY
Qty: 30 TABLET | Refills: 3 | Status: SHIPPED | OUTPATIENT
Start: 2022-04-18 | End: 2022-06-29

## 2022-04-18 NOTE — PROGRESS NOTES
Subjective   Suresh Alcantar is a 57 y.o. male.     History of Present Illness   Patient presents with c/o runny nose and cough that started Monday.  He has had no fever or shortness of breath. Patient takes daily zyrtec. He has taken afrin and guaifenesin over the counter. Patient did take an over the counter Covid test at home that was negative. Patient's care giver, Aarti, was present at visit and assisted with history.     The following portions of the patient's history were reviewed and updated as appropriate: allergies, current medications, past family history, past medical history, past social history, past surgical history and problem list.    Review of Systems   Constitutional: Negative for appetite change, chills, fatigue and fever.   HENT: Positive for congestion, postnasal drip and sore throat. Negative for ear pain, rhinorrhea, sinus pressure and sneezing.    Eyes: Negative for redness and itching.   Respiratory: Positive for cough. Negative for chest tightness and shortness of breath.    Cardiovascular: Negative for chest pain, palpitations and leg swelling.   Musculoskeletal: Negative for myalgias.   Allergic/Immunologic: Negative.    Neurological: Negative for dizziness and headache.       Objective   Physical Exam  Vitals and nursing note reviewed.   Constitutional:       Appearance: Normal appearance. He is well-developed and normal weight.   HENT:      Head: Normocephalic and atraumatic.      Right Ear: Tympanic membrane, ear canal and external ear normal.      Left Ear: Ear canal and external ear normal.      Nose: Nose normal.      Right Sinus: No maxillary sinus tenderness or frontal sinus tenderness.      Left Sinus: No maxillary sinus tenderness or frontal sinus tenderness.      Mouth/Throat:      Mouth: Mucous membranes are dry.      Pharynx: Pharyngeal swelling and posterior oropharyngeal erythema present. No oropharyngeal exudate.      Tonsils: No tonsillar exudate or tonsillar  abscesses.   Eyes:      General:         Right eye: No discharge.         Left eye: No discharge.      Conjunctiva/sclera: Conjunctivae normal.      Pupils: Pupils are equal, round, and reactive to light.   Neck:      Thyroid: No thyromegaly.   Cardiovascular:      Rate and Rhythm: Normal rate and regular rhythm.      Heart sounds: Normal heart sounds. No murmur heard.  Pulmonary:      Effort: Pulmonary effort is normal. No tachypnea or respiratory distress.      Breath sounds: Examination of the right-upper field reveals wheezing. Examination of the left-upper field reveals wheezing. Examination of the right-lower field reveals wheezing. Examination of the left-lower field reveals wheezing. Wheezing present. No decreased breath sounds, rhonchi or rales.   Musculoskeletal:      Cervical back: Normal range of motion and neck supple.   Lymphadenopathy:      Cervical: No cervical adenopathy.   Skin:     General: Skin is warm and dry.   Neurological:      Mental Status: He is alert and oriented to person, place, and time.   Psychiatric:         Behavior: Behavior normal.         Thought Content: Thought content normal.         Judgment: Judgment normal.         Vitals:    04/18/22 1131   BP: 140/90   Pulse: 84   SpO2: 99%     Body mass index is 20.08 kg/m².    Procedures    Assessment/Plan   Problems Addressed this Visit    None     Visit Diagnoses     Seasonal allergies    -  Primary    Relevant Medications    oxymetazoline (Afrin Nasal Spray) 0.05 % nasal spray    fexofenadine (Allegra Allergy) 180 MG tablet    Cough        Relevant Medications    guaiFENesin 200 MG tablet    Upper respiratory tract infection, unspecified type        Relevant Medications    azithromycin (Zithromax Z-Leonard) 250 MG tablet      Diagnoses       Codes Comments    Seasonal allergies    -  Primary ICD-10-CM: J30.2  ICD-9-CM: 477.9     Cough     ICD-10-CM: R05.9  ICD-9-CM: 786.2     Upper respiratory tract infection, unspecified type      ICD-10-CM: J06.9  ICD-9-CM: 465.9         Discontinue zyrtec  Start allegra 180mg 1p.o. QD for patient's seasonal allergies  Guaifenesin 200mg 1p.o. Q6H PRN  Z-Pack for URI         Return if symptoms worsen or fail to improve.

## 2022-04-18 NOTE — PATIENT INSTRUCTIONS
Return if symptoms worsen or fail to improve.  Discontinue zyrtec  Start allegra 180mg 1p.o. QD for patient's seasonal allergies

## 2022-05-04 RX ORDER — BACLOFEN 20 MG/1
TABLET ORAL
Qty: 120 TABLET | Refills: 1 | Status: SHIPPED | OUTPATIENT
Start: 2022-05-04 | End: 2022-06-29

## 2022-05-31 RX ORDER — OMEPRAZOLE 40 MG/1
CAPSULE, DELAYED RELEASE ORAL
Qty: 90 CAPSULE | Refills: 2 | Status: SHIPPED | OUTPATIENT
Start: 2022-05-31 | End: 2023-02-10

## 2022-05-31 RX ORDER — OXYBUTYNIN CHLORIDE 5 MG/1
TABLET, EXTENDED RELEASE ORAL
Qty: 63 TABLET | Refills: 3 | Status: SHIPPED | OUTPATIENT
Start: 2022-05-31 | End: 2023-02-10

## 2022-06-08 ENCOUNTER — TELEPHONE (OUTPATIENT)
Dept: FAMILY MEDICINE CLINIC | Facility: CLINIC | Age: 57
End: 2022-06-08

## 2022-06-08 NOTE — TELEPHONE ENCOUNTER
Caller: ADRIANO STRATTON  (NOT ON VERBAL)    Relationship to patient:ONE OF PATIENT'S CAREGIVERS      Best call back number: [phone] ([employer] [employer])     Date of exposure:     Date of positive COVID19 test: RESULTS RECEIVED 6-8-22     Date if possible COVID19 exposure:     COVID19 symptoms: COUGH, NO TROUBLE BREATHING       Date of initial quarantine:     Additional information or concerns: PLEASE ADVISE IF THERE ARE ANY SPECIAL INSTRUCTIONS     What is the patients preferred pharmacy:

## 2022-06-09 ENCOUNTER — HOSPITAL ENCOUNTER (OUTPATIENT)
Dept: INFUSION THERAPY | Facility: HOSPITAL | Age: 57
Setting detail: INFUSION SERIES
Discharge: HOME | End: 2022-06-09

## 2022-06-09 ENCOUNTER — TRANSCRIBE ORDERS (OUTPATIENT)
Dept: ADMINISTRATIVE | Facility: HOSPITAL | Age: 57
End: 2022-06-09

## 2022-06-09 VITALS
OXYGEN SATURATION: 95 % | TEMPERATURE: 97.5 F | DIASTOLIC BLOOD PRESSURE: 76 MMHG | HEART RATE: 89 BPM | SYSTOLIC BLOOD PRESSURE: 146 MMHG | RESPIRATION RATE: 16 BRPM

## 2022-06-09 DIAGNOSIS — U07.1 CLINICAL DIAGNOSIS OF SEVERE ACUTE RESPIRATORY SYNDROME CORONAVIRUS 2 (SARS-COV-2) DISEASE: Primary | ICD-10-CM

## 2022-06-09 DIAGNOSIS — U07.1 COVID-19 VIRUS DETECTED: Primary | ICD-10-CM

## 2022-06-09 PROCEDURE — 96374 THER/PROPH/DIAG INJ IV PUSH: CPT

## 2022-06-09 PROCEDURE — M0222 HC INJECTION BEBTELOVIMAB: HCPCS | Performed by: FAMILY MEDICINE

## 2022-06-09 PROCEDURE — 25010000002 INJECTION, BEBTELOVIMAB, 175 MG: Performed by: FAMILY MEDICINE

## 2022-06-09 RX ORDER — METHYLPREDNISOLONE SODIUM SUCCINATE 125 MG/2ML
125 INJECTION, POWDER, LYOPHILIZED, FOR SOLUTION INTRAMUSCULAR; INTRAVENOUS AS NEEDED
Status: CANCELLED | OUTPATIENT
Start: 2022-06-09

## 2022-06-09 RX ORDER — SODIUM CHLORIDE 9 MG/ML
30 INJECTION, SOLUTION INTRAVENOUS ONCE
Status: CANCELLED | OUTPATIENT
Start: 2022-06-09 | End: 2022-06-09

## 2022-06-09 RX ORDER — BEBTELOVIMAB 87.5 MG/ML
175 INJECTION, SOLUTION INTRAVENOUS ONCE
Status: CANCELLED | OUTPATIENT
Start: 2022-06-09

## 2022-06-09 RX ORDER — DIPHENHYDRAMINE HYDROCHLORIDE 50 MG/ML
50 INJECTION INTRAMUSCULAR; INTRAVENOUS ONCE AS NEEDED
Status: CANCELLED | OUTPATIENT
Start: 2022-06-09

## 2022-06-09 RX ORDER — EPINEPHRINE 1 MG/ML
0.3 INJECTION, SOLUTION, CONCENTRATE INTRAVENOUS AS NEEDED
Status: CANCELLED | OUTPATIENT
Start: 2022-06-09

## 2022-06-09 RX ORDER — DIPHENHYDRAMINE HCL 50 MG
50 CAPSULE ORAL ONCE AS NEEDED
Status: CANCELLED | OUTPATIENT
Start: 2022-06-09

## 2022-06-09 RX ORDER — SODIUM CHLORIDE 9 MG/ML
30 INJECTION, SOLUTION INTRAVENOUS ONCE
Status: DISCONTINUED | OUTPATIENT
Start: 2022-06-09 | End: 2022-06-11 | Stop reason: HOSPADM

## 2022-06-09 RX ORDER — DIPHENHYDRAMINE HYDROCHLORIDE 50 MG/ML
50 INJECTION INTRAMUSCULAR; INTRAVENOUS ONCE AS NEEDED
Status: DISCONTINUED | OUTPATIENT
Start: 2022-06-09 | End: 2022-06-11 | Stop reason: HOSPADM

## 2022-06-09 RX ORDER — BEBTELOVIMAB 87.5 MG/ML
175 INJECTION, SOLUTION INTRAVENOUS ONCE
Status: COMPLETED | OUTPATIENT
Start: 2022-06-09 | End: 2022-06-09

## 2022-06-09 RX ORDER — DIPHENHYDRAMINE HYDROCHLORIDE 50 MG/ML
50 INJECTION INTRAMUSCULAR; INTRAVENOUS ONCE AS NEEDED
OUTPATIENT
Start: 2022-06-09

## 2022-06-09 RX ORDER — METHYLPREDNISOLONE SODIUM SUCCINATE 125 MG/2ML
125 INJECTION, POWDER, LYOPHILIZED, FOR SOLUTION INTRAMUSCULAR; INTRAVENOUS AS NEEDED
OUTPATIENT
Start: 2022-06-09

## 2022-06-09 RX ORDER — DIPHENHYDRAMINE HCL 25 MG
50 CAPSULE ORAL ONCE AS NEEDED
Status: DISCONTINUED | OUTPATIENT
Start: 2022-06-09 | End: 2022-06-11 | Stop reason: HOSPADM

## 2022-06-09 RX ORDER — METHYLPREDNISOLONE SODIUM SUCCINATE 125 MG/2ML
125 INJECTION, POWDER, LYOPHILIZED, FOR SOLUTION INTRAMUSCULAR; INTRAVENOUS AS NEEDED
Status: DISCONTINUED | OUTPATIENT
Start: 2022-06-09 | End: 2022-06-11 | Stop reason: HOSPADM

## 2022-06-09 RX ORDER — DIPHENHYDRAMINE HCL 25 MG
50 CAPSULE ORAL ONCE AS NEEDED
OUTPATIENT
Start: 2022-06-09

## 2022-06-09 RX ADMIN — BEBTELOVIMAB 175 MG: 87.5 INJECTION, SOLUTION INTRAVENOUS at 15:02

## 2022-06-09 NOTE — PROGRESS NOTES
Pt tolerated infusion well. BP taken when med was given and at the time of discharge because pt becomes agitated when taking BP. Difficulty obtaining BP due to constant moving of patient. BP obtained using manual cuff.

## 2022-06-09 NOTE — TELEPHONE ENCOUNTER
I wrote my own. His quadriplegia makes him susceptible to pneumonia. Let's try. I filled out the form and added that

## 2022-06-09 NOTE — TELEPHONE ENCOUNTER
Spoke w/ Oscar . Bryce .  They state they will be taking Carlos A for his infusion on 6/15.  Oscar will also reach out to Marsha and let her know I am trying to reach her and to let her know the plan

## 2022-06-09 NOTE — PROGRESS NOTES
Patient provided with Fact Sheet for Patients, Parents and Caregivers Emergency Use Authorization (EUA) of Bebtelovimab for Coronavirus Disease 2019 (COVID-19) form.    Reviewed and patient verbalized understanding.  Appropriate PPE worn during the care of the patient.  Advised patient not to receive Covid vaccine for 90 days.    Patient very spastic, difficulty obtaining vital signs.

## 2022-06-17 ENCOUNTER — TELEPHONE (OUTPATIENT)
Dept: FAMILY MEDICINE CLINIC | Facility: CLINIC | Age: 57
End: 2022-06-17

## 2022-06-17 NOTE — TELEPHONE ENCOUNTER
[facility] IS CALLING IN TO INFORM DR ZUÑIGA AND STAFF THAT HE FORGOT TO GIVE THE PATIENT HIS BACLOFEN 20MG TODAY    BRANDON CALL BACK  [phone]

## 2022-06-20 ENCOUNTER — TELEPHONE (OUTPATIENT)
Dept: FAMILY MEDICINE CLINIC | Facility: CLINIC | Age: 57
End: 2022-06-20

## 2022-06-20 NOTE — TELEPHONE ENCOUNTER
Received call on 6/19/2022 from patient's caregiver Oscar stating that they had forgotten to give patient's baclofen. Caregiver was asking if he could give a triple dose now. Advised to not give the extra medication and start the appropriate regimen in the AM. Patient received an AM dose and PM dose.  Advised caregiver to call with any other issues.

## 2022-06-28 RX ORDER — NICOTINE 14MG/24HR
PATCH, TRANSDERMAL 24 HOURS TRANSDERMAL
Qty: 265 CAPSULE | Refills: 11 | Status: SHIPPED | OUTPATIENT
Start: 2022-06-28 | End: 2023-03-02 | Stop reason: SDUPTHER

## 2022-06-29 DIAGNOSIS — J30.2 SEASONAL ALLERGIES: ICD-10-CM

## 2022-06-29 RX ORDER — FEXOFENADINE HYDROCHLORIDE 180 MG/1
TABLET ORAL
Qty: 90 TABLET | Refills: 3 | Status: SHIPPED | OUTPATIENT
Start: 2022-06-29 | End: 2022-11-10 | Stop reason: SDUPTHER

## 2022-06-29 RX ORDER — BACLOFEN 20 MG/1
TABLET ORAL
Qty: 120 TABLET | Refills: 1 | Status: SHIPPED | OUTPATIENT
Start: 2022-06-29 | End: 2022-08-29

## 2022-07-25 ENCOUNTER — TELEPHONE (OUTPATIENT)
Dept: FAMILY MEDICINE CLINIC | Facility: CLINIC | Age: 57
End: 2022-07-25

## 2022-07-25 NOTE — TELEPHONE ENCOUNTER
Caller: GLENN Duggan    Best call back number: [phone]    Who are you requesting to speak with (clinical staff, provider,  specific staff member): CLINICAL STAFF     What was the call regarding: HIS EVENING STAFF ON 7/23/22 DID NOT GIVE HIS NIGHT TIME MEDICATION IS THERE ANY PROTOCOL THEY NEED TO FOLLOW AND JUST NEEDED TO LET OFFICE KNOW HE MISSED NIGHT MEDS ON 7/23/22    Do you require a callback: YES

## 2022-07-25 NOTE — TELEPHONE ENCOUNTER
Per verbal conversation with Priyanka, ok to just resume meds like normal. LM for Carmencita to call the office back. Did not sound like a secure voicemail, so did not want to leave instructions.

## 2022-07-25 NOTE — TELEPHONE ENCOUNTER
Looks like he takes Omeprazole, Colace and Oxybutynin at bedtime. I'm assuming there is not specific protocol for missing these other than just resuming them like normal. Please advise.

## 2022-08-29 RX ORDER — BACLOFEN 20 MG/1
TABLET ORAL
Qty: 120 TABLET | Refills: 1 | Status: SHIPPED | OUTPATIENT
Start: 2022-08-29 | End: 2022-11-15

## 2022-08-30 ENCOUNTER — TELEPHONE (OUTPATIENT)
Dept: FAMILY MEDICINE CLINIC | Facility: CLINIC | Age: 57
End: 2022-08-30

## 2022-09-01 ENCOUNTER — OFFICE VISIT (OUTPATIENT)
Dept: FAMILY MEDICINE CLINIC | Facility: CLINIC | Age: 57
End: 2022-09-01

## 2022-09-01 VITALS
SYSTOLIC BLOOD PRESSURE: 130 MMHG | OXYGEN SATURATION: 98 % | DIASTOLIC BLOOD PRESSURE: 80 MMHG | HEART RATE: 72 BPM | RESPIRATION RATE: 18 BRPM

## 2022-09-01 DIAGNOSIS — R13.11 ORAL PHASE DYSPHAGIA: ICD-10-CM

## 2022-09-01 DIAGNOSIS — G80.1 SPASTIC DIPLEGIC CEREBRAL PALSY: Primary | ICD-10-CM

## 2022-09-01 PROCEDURE — 99214 OFFICE O/P EST MOD 30 MIN: CPT | Performed by: FAMILY MEDICINE

## 2022-09-01 NOTE — PROGRESS NOTES
Subjective   Suresh lAcantar is a 57 y.o. male.   Immobility    History of Present Illness   Carlos A is here w/ caregiver.  Carlos A is here because his  would like him to have some home PT/OT.  Carlos A is not getting out of the house much anymore since Covid.  He is losing some strength and ability to transfer.  He no longer goes in a car unless to a MD appt.  His  states he is having a bathroom remodel done to help w/ his lack of mobility and will need some guidance figuring out how to safely use this new equipment.  His Rt arm has no mobility and his legs will barely straighten and his ability to bear weight in transferring as declined to needing total support to move.   Because of this, he is home bound .  The following portions of the patient's history were reviewed and updated as appropriate: allergies, current medications, past family history, past medical history, past social history, past surgical history and problem list.    Review of Systems   HENT: Negative.    Eyes: Negative.    Respiratory: Negative.    Cardiovascular: Negative.    Genitourinary: Negative.    Musculoskeletal: Positive for arthralgias.        Strictures in extremities   Skin: Negative.    Neurological: Positive for speech difficulty and weakness.   Psychiatric/Behavioral: Negative.  Negative for agitation and behavioral problems.       Objective   Physical Exam  Vitals and nursing note reviewed.   Constitutional:       General: He is not in acute distress.     Appearance: He is well-developed.      Comments: In a light weight wheelchair.  Accompanied by caregiver.   HENT:      Head: Normocephalic and atraumatic.      Right Ear: External ear normal.      Left Ear: External ear normal.   Eyes:      Pupils: Pupils are equal, round, and reactive to light.   Cardiovascular:      Rate and Rhythm: Normal rate and regular rhythm.   Pulmonary:      Effort: Pulmonary effort is normal.      Breath sounds: Normal breath sounds.    Musculoskeletal:      Cervical back: Neck supple.      Comments: Significant flexure deformities in upper and lower limbs.  Decreased muscle tone in legs and upper arms.  Spasticity in all extremities   Lymphadenopathy:      Cervical: No cervical adenopathy.   Skin:     General: Skin is warm and dry.   Neurological:      Mental Status: He is alert and oriented to person, place, and time.      Motor: Weakness present.      Coordination: Coordination abnormal.      Comments: Unable to ambulate   Psychiatric:         Mood and Affect: Mood normal.         Behavior: Behavior normal.         Thought Content: Thought content normal.         Judgment: Judgment normal.           Assessment & Plan   Problem List Items Addressed This Visit        Neuro    Spastic diplegic cerebral palsy (HCC) - Primary (Chronic)      Other Visit Diagnoses     Oral phase dysphagia             I have advised patient to get assistance with home health physical therapy.        Return in about 6 months (around 3/1/2023) for Recheck.

## 2022-09-07 ENCOUNTER — HOME HEALTH ADMISSION (OUTPATIENT)
Dept: HOME HEALTH SERVICES | Facility: HOME HEALTHCARE | Age: 57
End: 2022-09-07

## 2022-09-07 DIAGNOSIS — G80.0 SPASTIC QUADRIPLEGIC CEREBRAL PALSY: Primary | ICD-10-CM

## 2022-11-10 DIAGNOSIS — J30.2 SEASONAL ALLERGIES: ICD-10-CM

## 2022-11-10 RX ORDER — CALCIUM CARBONATE 200(500)MG
1 TABLET,CHEWABLE ORAL 3 TIMES DAILY
Qty: 30 TABLET | Refills: 11 | Status: SHIPPED | OUTPATIENT
Start: 2022-11-10

## 2022-11-10 RX ORDER — IBUPROFEN 200 MG
200 TABLET ORAL EVERY 8 HOURS PRN
Qty: 28 TABLET | Refills: 11 | Status: SHIPPED | OUTPATIENT
Start: 2022-11-10

## 2022-11-10 RX ORDER — FEXOFENADINE HCL 180 MG/1
180 TABLET ORAL DAILY
Qty: 30 TABLET | Refills: 11 | Status: SHIPPED | OUTPATIENT
Start: 2022-11-10

## 2022-11-10 RX ORDER — DIAPER,BRIEF,INFANT-TODD,DISP
1 EACH MISCELLANEOUS 2 TIMES DAILY
Qty: 45 G | Refills: 11 | Status: SHIPPED | OUTPATIENT
Start: 2022-11-10

## 2022-11-10 RX ORDER — DIMETHICONE, CAMPHOR (SYNTHETIC), MENTHOL, AND PHENOL 1.1; .5; .625; .5 G/100G; G/100G; G/100G; G/100G
1 OINTMENT TOPICAL 2 TIMES DAILY PRN
Qty: 10 G | Refills: 11 | Status: SHIPPED | OUTPATIENT
Start: 2022-11-10

## 2022-11-10 RX ORDER — PEDIATRIC MULTIVITAMIN NO.17
1 TABLET,CHEWABLE ORAL DAILY
COMMUNITY
Start: 2022-10-18 | End: 2023-03-02 | Stop reason: SDUPTHER

## 2022-11-15 RX ORDER — BACLOFEN 20 MG/1
TABLET ORAL
Qty: 120 TABLET | Refills: 1 | Status: SHIPPED | OUTPATIENT
Start: 2022-11-15 | End: 2023-01-10

## 2022-11-23 ENCOUNTER — TELEPHONE (OUTPATIENT)
Dept: FAMILY MEDICINE CLINIC | Facility: CLINIC | Age: 57
End: 2022-11-23

## 2022-11-23 NOTE — TELEPHONE ENCOUNTER
Caller: CAMACHO    Relationship: Other    Best call back number: [phone]    Who are you requesting to speak with (clinical staff, provider,  specific staff member): CLINICAL    What was the call regarding: CAMACHO FROM [facility] [employer] [employer] IS CALLING TO CONFIRM THAT A FAX WAS RECEIVED AT THE OFFICE FOR A PRESCRIPTION FOR ORDERS OF REPAIR FOR THE PATIENT'S MANUAL WHEELCHAIR. SHE ALSO WANTED TO KNOW IF  A CERTIFICATE  OF MEDICAL NECESSITY WAS RECEIVED.     FAX NUMBER: [phone]    Do you require a callback: YES

## 2022-12-01 ENCOUNTER — TELEPHONE (OUTPATIENT)
Dept: FAMILY MEDICINE CLINIC | Facility: CLINIC | Age: 57
End: 2022-12-01

## 2022-12-01 ENCOUNTER — OFFICE VISIT (OUTPATIENT)
Dept: FAMILY MEDICINE CLINIC | Facility: CLINIC | Age: 57
End: 2022-12-01

## 2022-12-01 VITALS — DIASTOLIC BLOOD PRESSURE: 82 MMHG | SYSTOLIC BLOOD PRESSURE: 128 MMHG

## 2022-12-01 DIAGNOSIS — K64.9 HEMORRHOIDS, UNSPECIFIED HEMORRHOID TYPE: Primary | ICD-10-CM

## 2022-12-01 DIAGNOSIS — B00.1 HERPES LABIALIS: ICD-10-CM

## 2022-12-01 PROCEDURE — 99213 OFFICE O/P EST LOW 20 MIN: CPT | Performed by: NURSE PRACTITIONER

## 2022-12-01 RX ORDER — DOCUSATE SODIUM 100 MG/1
100 CAPSULE, LIQUID FILLED ORAL 2 TIMES DAILY
Qty: 60 CAPSULE | Refills: 1 | Status: SHIPPED | OUTPATIENT
Start: 2022-12-01 | End: 2023-01-10

## 2022-12-01 RX ORDER — PRAMOXINE HYDROCHLORIDE 10 MG/G
AEROSOL, FOAM TOPICAL
Qty: 15 G | Refills: 0 | Status: SHIPPED | OUTPATIENT
Start: 2022-12-01 | End: 2022-12-08

## 2022-12-01 RX ORDER — VALACYCLOVIR HYDROCHLORIDE 1 G/1
2000 TABLET, FILM COATED ORAL 2 TIMES DAILY
Qty: 4 TABLET | Refills: 0 | Status: SHIPPED | OUTPATIENT
Start: 2022-12-01 | End: 2022-12-02

## 2022-12-01 RX ORDER — GUAIFENESIN 600 MG/1
600 TABLET, EXTENDED RELEASE ORAL
COMMUNITY
Start: 2022-11-26 | End: 2023-11-26

## 2022-12-01 RX ORDER — BENZONATATE 100 MG/1
100 CAPSULE ORAL
COMMUNITY
Start: 2022-11-26

## 2022-12-01 NOTE — TELEPHONE ENCOUNTER
Caller: JULIAN    Relationship: Provider    Best call back number: [phone]    What is the best time to reach you: ANY     What was the call regarding: JULIAN FROM [employer] AND Clari IS CALLING BECAUSE SHE RECEIVED THE FAX BUT IT WAS ONLY THE CMN AND NEEDS IT TO BE RESENT. PLEASE ADVISE. THANKS.     Do you require a callback: NO

## 2022-12-01 NOTE — PROGRESS NOTES
Subjective   Suresh Alcantar is a 57 y.o. male.     History of Present Illness   Dr. Gonzalez patient.  New to this provider.  Acute visit today.    Acute cold sore to top lip x 1 week. Using abreva. Has prev used valtrex. Recently had flu and was ill.     Acute on chronic hemorrohids. Painful w sitting. No blood in stool. Some straining. H/o constipation. On colace 100 qd.     The following portions of the patient's history were reviewed and updated as appropriate: allergies, current medications, past family history, past medical history, past social history, past surgical history and problem list.    Review of Systems    Objective   Physical Exam  Vitals reviewed.   Constitutional:       Appearance: Normal appearance.      Comments: chronic CP, wheelchair bound   HENT:      Nose: Nose normal.      Mouth/Throat:      Mouth: Mucous membranes are moist.        Comments: Clustered red raised vesicles  Eyes:      Pupils: Pupils are equal, round, and reactive to light.   Cardiovascular:      Rate and Rhythm: Normal rate and regular rhythm.      Pulses: Normal pulses.      Heart sounds: Normal heart sounds.   Pulmonary:      Effort: Pulmonary effort is normal.      Breath sounds: Normal breath sounds.   Abdominal:      General: Bowel sounds are normal.      Palpations: Abdomen is soft.   Musculoskeletal:      Cervical back: Neck supple.   Lymphadenopathy:      Cervical: Cervical adenopathy present.   Skin:     General: Skin is warm.   Neurological:      Mental Status: He is alert.         Vitals:    12/01/22 1332   BP: 128/82     There is no height or weight on file to calculate BMI.    Procedures    Assessment & Plan   Problems Addressed this Visit    None  Visit Diagnoses     Hemorrhoids, unspecified hemorrhoid type    -  Primary    Herpes labialis        Relevant Medications    valACYclovir (Valtrex) 1000 MG tablet      Diagnoses       Codes Comments    Hemorrhoids, unspecified hemorrhoid type    -  Primary ICD-10-CM:  K64.9  ICD-9-CM: 455.6     Herpes labialis     ICD-10-CM: B00.1  ICD-9-CM: 054.9         Herpes labialis-Rx Valtrex 2000 twice daily x1 day, may use topical Abreva  Staff to use good handwashing technique    Hemorrhoids-Rx Proctofoam q2 h as needed, increase colace to bid , drink water , increase fiber          Education provided in AVS   Return if symptoms worsen or fail to improve.

## 2023-01-05 ENCOUNTER — TELEPHONE (OUTPATIENT)
Dept: FAMILY MEDICINE CLINIC | Facility: CLINIC | Age: 58
End: 2023-01-05
Payer: MEDICARE

## 2023-01-05 NOTE — TELEPHONE ENCOUNTER
[employer] calling to follow up on paperwork that was faxed 12/29/22 for a cushion. Received prescription and one page of the CNM.  Could not find in sent faxes

## 2023-01-10 RX ORDER — DOCUSATE SODIUM 100 MG/1
CAPSULE, LIQUID FILLED ORAL
Qty: 60 CAPSULE | Refills: 1 | Status: SHIPPED | OUTPATIENT
Start: 2023-01-10 | End: 2023-03-20 | Stop reason: SDUPTHER

## 2023-01-10 RX ORDER — BACLOFEN 20 MG/1
TABLET ORAL
Qty: 120 TABLET | Refills: 1 | Status: SHIPPED | OUTPATIENT
Start: 2023-01-10 | End: 2023-02-10

## 2023-01-19 ENCOUNTER — TELEPHONE (OUTPATIENT)
Dept: FAMILY MEDICINE CLINIC | Facility: CLINIC | Age: 58
End: 2023-01-19

## 2023-01-19 NOTE — TELEPHONE ENCOUNTER
Caller: [employer]    Relationship: Other    Best call back number: [phone]    What is the best time to reach you: ANY     Who are you requesting to speak with (clinical staff, provider,  specific staff member): CLINICAL STAFF     What was the call regarding: PATIENT IN NEED OF WHEELCHAIR REPAIR AND [employer] HAS FAXED PAPERWORK OVER SEVERAL TIMES SINCE November. REQUESTING A CALL BACK TO CHECK THE STATUS OF THE CERTIFICATE OF MEDICAL NECESSITY . WILL FAX TO BOTH FAX NUMBERS IN OFFICE AGAIN TODAY.     Do you require a callback: YES

## 2023-02-08 DIAGNOSIS — T17.308D CHOKING, SUBSEQUENT ENCOUNTER: Primary | ICD-10-CM

## 2023-02-10 RX ORDER — BACLOFEN 20 MG/1
TABLET ORAL
Qty: 120 TABLET | Refills: 1 | Status: SHIPPED | OUTPATIENT
Start: 2023-02-10

## 2023-02-10 RX ORDER — OMEPRAZOLE 40 MG/1
CAPSULE, DELAYED RELEASE ORAL
Qty: 90 CAPSULE | Refills: 2 | Status: SHIPPED | OUTPATIENT
Start: 2023-02-10

## 2023-02-10 RX ORDER — OXYBUTYNIN CHLORIDE 5 MG/1
TABLET, EXTENDED RELEASE ORAL
Qty: 63 TABLET | Refills: 3 | Status: SHIPPED | OUTPATIENT
Start: 2023-02-10

## 2023-03-02 ENCOUNTER — OFFICE VISIT (OUTPATIENT)
Dept: FAMILY MEDICINE CLINIC | Facility: CLINIC | Age: 58
End: 2023-03-02
Payer: MEDICARE

## 2023-03-02 VITALS
HEART RATE: 84 BPM | SYSTOLIC BLOOD PRESSURE: 124 MMHG | RESPIRATION RATE: 16 BRPM | OXYGEN SATURATION: 94 % | DIASTOLIC BLOOD PRESSURE: 70 MMHG

## 2023-03-02 DIAGNOSIS — G80.1 SPASTIC DIPLEGIC CEREBRAL PALSY: Chronic | ICD-10-CM

## 2023-03-02 DIAGNOSIS — R30.0 DYSURIA: ICD-10-CM

## 2023-03-02 DIAGNOSIS — Z23 NEED FOR VACCINATION: ICD-10-CM

## 2023-03-02 DIAGNOSIS — Z00.00 MEDICARE ANNUAL WELLNESS VISIT, SUBSEQUENT: Primary | ICD-10-CM

## 2023-03-02 PROCEDURE — 1170F FXNL STATUS ASSESSED: CPT | Performed by: FAMILY MEDICINE

## 2023-03-02 PROCEDURE — 91312 COVID-19 (PFIZER) BIVALENT BOOSTER 12+YRS: CPT | Performed by: FAMILY MEDICINE

## 2023-03-02 PROCEDURE — G0439 PPPS, SUBSEQ VISIT: HCPCS | Performed by: FAMILY MEDICINE

## 2023-03-02 PROCEDURE — 99213 OFFICE O/P EST LOW 20 MIN: CPT | Performed by: FAMILY MEDICINE

## 2023-03-02 PROCEDURE — 0124A PR ADM SARSCOV2 30MCG/0.3ML BST: CPT | Performed by: FAMILY MEDICINE

## 2023-03-02 PROCEDURE — 1160F RVW MEDS BY RX/DR IN RCRD: CPT | Performed by: FAMILY MEDICINE

## 2023-03-02 NOTE — PROGRESS NOTES
Medicare Subsequent Wellness Visit  Subjective   Suresh Alcantar is a 58 y.o. male who presents for an Medicare Wellness Visit.   Patient Care Team:  Naresh Gonzalez MD as PCP - General  Carlos A is here with his caregiver today for an annual Medicare  wellness visit  He is alert, verbal, he participates in his visit today.  Recent Hospitalizations:  none    Age-appropriate Screening Schedule:  Refer to the list below for future screening recommendations based on patient's age. The patient has been provided with a written plan.    Health Maintenance   Topic Date Due   • LIPID PANEL  04/05/2020   • INFLUENZA VACCINE  08/01/2022   • ZOSTER VACCINE (1 of 2) 03/02/2023 (Originally 1/19/2015)   • ANNUAL WELLNESS VISIT  03/02/2024   • TDAP/TD VACCINES (2 - Td or Tdap) 12/13/2026   • COLORECTAL CANCER SCREENING  12/13/2026   • HEPATITIS C SCREENING  Completed   • COVID-19 Vaccine  Completed   • Pneumococcal Vaccine 0-64  Aged Out     Outpatient Medications Prior to Visit   Medication Sig Dispense Refill   • acetaminophen (TYLENOL) 500 MG tablet Take 1 tablet by mouth Every 6 (Six) Hours As Needed for mild pain (1-3). 90 tablet 3   • baclofen (LIORESAL) 20 MG tablet TAKE ONE TABLET BY MOUTH FOUR TIMES DAILY 120 tablet 1   • calcium carbonate (Calcium Antacid) 500 MG chewable tablet Chew 1 tablet 3 (Three) Times a Day. 30 tablet 11   • Cold Sore Products (Blistex ointment) ointment Apply 1 application topically 2 (Two) Times a Day As Needed (lip dryness). 10 g 11   • docusate sodium (COLACE) 100 MG capsule TAKE ONE CAPSULE BY MOUTH TWICE DAILY 60 capsule 1   • fexofenadine (24HR Allergy Relief) 180 MG tablet Take 1 tablet by mouth Daily. 30 tablet 11   • omeprazole (priLOSEC) 40 MG capsule TAKE ONE CAPSULE BY MOUTH AT BEDTIME 90 capsule 2   • oxybutynin XL (DITROPAN-XL) 5 MG 24 hr tablet TAKE ONE TABLET BY MOUTH AT BEDTIME 63 tablet 3   • Probiotic Product (Advanced Probiotic) capsule Take 1 capsule by mouth Daily. 265  capsule 2   • Saccharomyces boulardii (Probiotic) 250 MG capsule TAKE ONE CAPSULE BY MOUTH DAILY 265 capsule 11   • benzonatate (TESSALON) 100 MG capsule Take 1 capsule by mouth.     • guaiFENesin (MUCINEX) 600 MG 12 hr tablet Take 1 tablet by mouth.     • hydrocortisone (Hydrocortisone/Aloe Max Str) 1 % cream Apply 1 application topically to the appropriate area as directed 2 (Two) Times a Day. 45 g 11   • ibuprofen (ADVIL,MOTRIN) 200 MG tablet Take 1 tablet by mouth Every 8 (Eight) Hours As Needed for Mild Pain. 28 tablet 11   • neomycin-bacitracin-polymyxin (NEOSPORIN) 5-400-5000 ointment Apply  topically to the appropriate area as directed 3 (Three) Times a Day As Needed for Irritation. 28 g 0   • oxymetazoline (AFRIN) 0.05 % nasal spray 2 sprays into the nostril(s) as directed by provider 2 (Two) Times a Day.     • Pediatric Multiple Vit-C-FA (multivitamin with C  & folic acid) with C & FA chewable tablet chewable tablet TAKE ONE TABLET BY MOUTH DAILY 36 tablet 11   • Pediatric Multiple Vitamins (Multivitamin Childrens) chewable tablet Chew 1 tablet Daily.       No facility-administered medications prior to visit.      Patient Active Problem List   Diagnosis   • Spastic diplegic cerebral palsy (HCC)   • COVID-19 virus detected   • Cerebral palsy (HCC)     Depression Screen:   PHQ-2/PHQ-9 Depression Screening 3/2/2023   Retired PHQ-9 Total Score -   Retired Total Score -   Little Interest or Pleasure in Doing Things 0-->not at all   Feeling Down, Depressed or Hopeless 0-->not at all   PHQ-9: Brief Depression Severity Measure Score 0       Functional and Cognitive Screening:  Functional & Cognitive Status 3/2/2023   Do you have difficulty preparing food and eating? Yes   Do you have difficulty bathing yourself, getting dressed or grooming yourself? Yes   Do you have difficulty using the toilet? Yes   Do you have difficulty moving around from place to place? Yes   Do you have trouble with steps or getting out of a  bed or a chair? Yes   Current Diet Well Balanced Diet   Dental Exam Up to date   Eye Exam Not up to date   Exercise (times per week) 0 times per week   Current Exercises Include No Regular Exercise        Exercise Comment -   Current Exercise Activities Include -   Do you need help using the phone?  Yes   Are you deaf or do you have serious difficulty hearing?  No   Do you need help with transportation? Yes   Do you need help shopping? Yes   Do you need help preparing meals?  Yes   Do you need help with housework?  Yes   Do you need help with laundry? Yes   Do you need help taking your medications? Yes   Do you need help managing money? Yes   Do you ever drive or ride in a car without wearing a seat belt? No   Have you felt unusual stress, anger or loneliness in the last month? No   Who do you live with? Other   If you need help, do you have trouble finding someone available to you? No   Have you been bothered in the last four weeks by sexual problems? No   Do you have difficulty concentrating, remembering or making decisions? Yes     Does the patient have evidence of cognitive impairment? Yes    Compared to one year ago, the patient feels their physical health and mental health are the same.     BMI is within normal parameters. No other follow-up for BMI required.       Objective     Vitals:    03/02/23 1146   BP: 124/70   Pulse: 84   Resp: 16   SpO2: 94%     There is no height or weight on file to calculate BMI.    Follow Up:  Medicare Well visit in one year    ADVANCED DIRECTIVES:   ACP discussion was held with the patient during this visit. Patient has an advance directive in EMR which is still valid.     An After Visit Summary and PPPS with all of these plans were given to the patient.     Additional E&M Note during same encounter follows:  Patient has multiple medical problems which are significant and separately identifiable that require additional work above and beyond the Medicare Wellness Visit.       Subjective   Suresh Alcantar is a 58 y.o. male who also presents for  Annual Exam (Subsequent Medicare Exam), Spastic diplegia cerebral palsy, and Cerebral Palsy     Carlos A has chronic spastic dysplasia cerebral palsy and is living in an independent apartment. He has a roommate, Parish, who helps care for him.He has a very active social life. His medical issues are stable. He is clean, skin is in good condition and he is very happy.  Carlos A has to use a straw.  At one point there was an attempt to try using a thickener with liquids but Carlos A rejected that idea and the family has decided that he is doing well enough on regular liquids. I have suggested a swallow study in the past but the family determined that it was not necessary at this time.  He is enjoying doing things like bowling and spending time with friends.    He is complaining of a little dysuria today so we have sent home a urine culture collection kit with his caregiver.  She will return at test as soon as she is able to collect a sample.    Review of Systems  Constitutional: Negative for activity change, appetite change, fatigue and fever.   HENT: Negative for sinus pain and sore throat.    Respiratory: Negative for cough and shortness of breath.    Musculoskeletal:        Unable to ambulate or use both arms effectively.   Neurological: Positive for tremors, speech difficulty and weakness. Negative for headaches.   Psychiatric/Behavioral: Negative.       PHYSICAL EXAM  Vitals reviewed   HEENT: PERRLA, EOMI. Oral mucosa moist,   No LAD.  CV: RRR, no murmurs, rubs, clicks or gallops  LUNGS: CTA bilaterally  EXT: No edema, FROM in bilateral upper and lower ext  NEURO: CN II - XII grossly intact  PSYCH: good mood, positive affect, alert and engaged. No thoughts of self harm  expressed.  Assessment & Plan   Problem List Items Addressed This Visit        Neuro    Spastic diplegic cerebral palsy (HCC) (Chronic)   Other Visit Diagnoses     Medicare annual  wellness visit, subsequent    -  Primary    Need for vaccination        Relevant Orders    COVID-19 Bivalent Booster (Pfizer) 12+yrs (Completed)    Dysuria        Relevant Orders    Urinalysis With Culture If Indicated -             Orders Placed This Encounter   Procedures   • COVID-19 Bivalent Booster (Pfizer) 12+yrs   • Urinalysis With Culture If Indicated -        Return in about 6 months (around 9/2/2023) for Recheck.

## 2023-03-03 NOTE — PATIENT INSTRUCTIONS
Medicare Wellness  Personal Prevention Plan of Service     Date of Office Visit:    Encounter Provider:  Naresh Gonzalez MD  Place of Service:  [facility]  Patient Name: Suresh Alcantar  :  1965    As part of the Medicare Wellness portion of your visit today, we are providing you with this personalized preventive plan of services (PPPS). This plan is based upon recommendations of the [place] Preventive Services Task Force (USPSTF) and the Advisory Committee on Immunization Practices (ACIP).    This lists the preventive care services that should be considered, and provides dates of when you are due. Items listed as completed are up-to-date and do not require any further intervention.    Health Maintenance   Topic Date Due    LIPID PANEL  2020    INFLUENZA VACCINE  2022    ZOSTER VACCINE (1 of 2) 2023 (Originally 2015)    ANNUAL WELLNESS VISIT  2024    TDAP/TD VACCINES (2 - Td or Tdap) 2026    COLORECTAL CANCER SCREENING  2026    HEPATITIS C SCREENING  Completed    COVID-19 Vaccine  Completed    Pneumococcal Vaccine 0-64  Aged Out       Orders Placed This Encounter   Procedures    COVID-19 Bivalent Booster (Pfizer) 12+yrs    Urinalysis With Culture If Indicated -     Order Specific Question:   Release to patient     Answer:   Routine Release       Return in about 6 months (around 2023) for Recheck.

## 2023-03-05 LAB
APPEARANCE UR: CLEAR
BACTERIA #/AREA URNS HPF: ABNORMAL /HPF
BACTERIA UR CULT: ABNORMAL
BACTERIA UR CULT: ABNORMAL
BILIRUB UR QL STRIP: NEGATIVE
CASTS URNS QL MICRO: ABNORMAL /LPF
COLOR UR: YELLOW
EPI CELLS #/AREA URNS HPF: ABNORMAL /HPF (ref 0–10)
GLUCOSE UR QL STRIP: NEGATIVE
HGB UR QL STRIP: ABNORMAL
KETONES UR QL STRIP: NEGATIVE
LEUKOCYTE ESTERASE UR QL STRIP: ABNORMAL
MICRO URNS: ABNORMAL
NITRITE UR QL STRIP: NEGATIVE
PH UR STRIP: 6 [PH] (ref 5–7.5)
PROT UR QL STRIP: NEGATIVE
RBC #/AREA URNS HPF: ABNORMAL /HPF (ref 0–2)
SP GR UR STRIP: 1.01 (ref 1–1.03)
URINALYSIS REFLEX: ABNORMAL
UROBILINOGEN UR STRIP-MCNC: 0.2 MG/DL (ref 0.2–1)
WBC #/AREA URNS HPF: >30 /HPF (ref 0–5)

## 2023-03-06 ENCOUNTER — TELEPHONE (OUTPATIENT)
Dept: FAMILY MEDICINE CLINIC | Facility: CLINIC | Age: 58
End: 2023-03-06

## 2023-03-06 ENCOUNTER — TELEPHONE (OUTPATIENT)
Dept: FAMILY MEDICINE CLINIC | Facility: CLINIC | Age: 58
End: 2023-03-06
Payer: MEDICARE

## 2023-03-06 DIAGNOSIS — N30.01 ACUTE CYSTITIS WITH HEMATURIA: Primary | ICD-10-CM

## 2023-03-06 RX ORDER — AMOXICILLIN 500 MG/1
TABLET, FILM COATED ORAL
Qty: 14 TABLET | Refills: 0 | Status: SHIPPED | OUTPATIENT
Start: 2023-03-06

## 2023-03-06 RX ORDER — AMOXICILLIN 500 MG/1
TABLET, FILM COATED ORAL
Qty: 14 TABLET | Refills: 0 | Status: SHIPPED | OUTPATIENT
Start: 2023-03-06 | End: 2023-03-06 | Stop reason: SDUPTHER

## 2023-03-06 NOTE — TELEPHONE ENCOUNTER
Caller: GLENN LAWRENCE    Relationship: HEALTH COORDINATOR     Best call back number: [phone]    What was the call regarding: PATIENT'S HEALTH COORDINATOR   STATED THAT SHE SAW IN PATIENT'S MYCHART THAT HIS URINALYSIS WAS FLAGGED FOR BLOOD IN URINE AND WHITE BLOOD CELLS. PATIENT'S HEALTH COORDINATOR IS CALLING TO SEE WHAT THE NEXT STEPS ARE. PLEASE ADVISE.    Do you require a callback: YES

## 2023-03-06 NOTE — TELEPHONE ENCOUNTER
Pharmacy Name:  [facility]    Pharmacy representative phone number: [phone]    What medication are you calling in regards to: AMOXICILLIN    What question does the pharmacy have: PLEASE CALL TO VERIFY DOSAGE AMOUNTS-PHARMACY STATES THE CURRENT DOSAGE THAT IS WRITTEN IN DIRECTIONS IS REALLY HIGH AND THE QUANTITY DOESN'T MATCH THE DIRECTIONS.     Who is the provider that prescribed the medication: DR ZUÑIGA

## 2023-03-07 ENCOUNTER — HOSPITAL ENCOUNTER (OUTPATIENT)
Dept: GENERAL RADIOLOGY | Facility: HOSPITAL | Age: 58
Discharge: HOME | End: 2023-03-07
Admitting: FAMILY MEDICINE
Payer: MEDICARE

## 2023-03-07 DIAGNOSIS — R13.10 DYSPHAGIA, UNSPECIFIED TYPE: Primary | ICD-10-CM

## 2023-03-07 DIAGNOSIS — T17.308D CHOKING, SUBSEQUENT ENCOUNTER: ICD-10-CM

## 2023-03-07 PROCEDURE — 92611 MOTION FLUOROSCOPY/SWALLOW: CPT | Performed by: SPEECH-LANGUAGE PATHOLOGIST

## 2023-03-07 PROCEDURE — 63710000001 BARIUM SULFATE 40 % RECONSTITUTED SUSPENSION: Performed by: FAMILY MEDICINE

## 2023-03-07 PROCEDURE — A9270 NON-COVERED ITEM OR SERVICE: HCPCS | Performed by: FAMILY MEDICINE

## 2023-03-07 PROCEDURE — 74230 X-RAY XM SWLNG FUNCJ C+: CPT

## 2023-03-07 PROCEDURE — 63710000001 BARIUM SULFATE 98 % RECONSTITUTED SUSPENSION: Performed by: FAMILY MEDICINE

## 2023-03-07 RX ADMIN — BARIUM SULFATE 55 ML: 0.81 POWDER, FOR SUSPENSION ORAL at 13:46

## 2023-03-07 RX ADMIN — BARIUM SULFATE 4 ML: 980 POWDER, FOR SUSPENSION ORAL at 13:46

## 2023-03-07 NOTE — MBS/VFSS/FEES
Outpatient Speech Language Pathology   Adult Swallow Initial Evaluation-VFSS  [facility]     Patient Name: Suresh Alcantar  : 1965  MRN: [id number]  Today's Date: 3/7/2023         Visit Date: 2023       SPEECH-LANGUAGE PATHOLOGY EVALUTION - VFSS  Subjective: The patient was seen on this date for a VFSS(Videofluoroscopic Swallowing Study).  Patient was alert and cooperative.    Objective: Risks/benefits were reviewed with the patient, sister, and caregivers and consent was obtained. The study was completed with SLP and Radiologist present. The patient was seen in lateral view(s). Textures given included thin liquid, puree consistency and mechanical soft consistency.  Assessment:  VFSS completed in conjunction w/ Dr Ambrosio. View compromised due to pt positioning and movement; however, airway able to be viewed during study.  Pt demonstrated transeint penetration of thin/mixed on one occasion which cleared with the swallow.  Further presentation with no penetration.  No aspiration observed across consistencies.  Prominent cricopharyngeus/diverticulum noted which backflow of portion of bolus into pyriforms with eventual clearing using spontaneous repeat swallows.  SLP Findings: Patient presents with mild to moderate oropharyngeal dysphagia.   Comments: No choking observed during study.  Recommendations: Diet Textures: thin liquid, mechanical soft consistency food. Medications should be taken whole or crushed with puree.  Recommended Strategies: Upright for PO, small bites and sips and supervision with all PO. Oral care before breakfast, after all meals and PRN.  liquids with small/standard sized straw; monitored while eating; dime to nickel sized pieces; seperate bowls/divided plate; small size utensils; consider sip controlled cup such as Provale or Bionix Safe straw            Patient Active Problem List   Diagnosis   • Spastic diplegic cerebral palsy (HCC)   • COVID-19 virus detected   • Cerebral  palsy (HCC)        Past Medical History:   Diagnosis Date   • Spastic diplegic cerebral palsy (HCC) 3/27/2018        No past surgical history on file.      Visit Dx:     ICD-10-CM ICD-9-CM   1. Dysphagia, unspecified type  R13.10 787.20   2. Choking, subsequent encounter  T17.308D V58.89     933.1            OP SLP Assessment/Plan - 03/07/23 1605        SLP Assessment    Functional Problems Swallowing  -SA    Impact on Function: Swallowing Risk of aspiration;Risk of pneumonia  -SA    Clinical Impression: Swallowing Mild-Moderate:  -SA    Patient/caregiver participated in establishment of treatment plan and goals Yes  -SA    Patient would benefit from skilled therapy intervention No  -SA       SLP Plan    Frequency eval only  -SA          User Key  (r) = Recorded By, (t) = Taken By, (c) = Cosigned By    Initials Name Provider Type    SA Mana Lopez MS CCC-SLP Speech and Language Pathologist                 SLP Adult Swallow Evaluation     Row Name 03/07/23 1400       Rehab Evaluation    Document Type evaluation (P)   -AO    Subjective Information no complaints (P)   -AO    Patient Observations alert;cooperative (P)   -AO    Patient Effort good (P)   -AO    Symptoms Noted During/After Treatment none (P)   -AO       General Information    Patient Profile Reviewed yes (P)   -AO    Pertinent History Of Current Problem Patient referred for VFSS by MD for concerns of swallowing. Caregiver reports several instances of patient choking and requiring the heimlich maneuver. Pt has diagnosis of spastic diplegic cerebral palsy. Family and caregiver present for discussion of evaluation results. (P)   -AO    Current Method of Nutrition soft to chew textures;thin liquids;other (see comments)  caregiver prepare foods often 'smashing' or cutting small  -SA    Precautions/Limitations, Vision WFL;for purposes of eval (P)   -AO    Precautions/Limitations, Hearing WFL;for purposes of eval (P)   -AO    Prior Level of  Function-Communication cognitive-linguistic impairment (P)   cerebral palsy diagnosis  -AO    Prior Level of Function-Swallowing no diet consistency restrictions (P)   -AO    Plans/Goals Discussed with patient and family;agreed upon (P)   caregivers  -AO    Barriers to Rehab medically complex (P)   -AO    Patient's Goals for Discharge patient did not state (P)   -AO       Pain    Additional Documentation Pain Scale: FACES Pre/Post-Treatment (Group) (P)   -AO       Pain Scale: FACES Pre/Post-Treatment    Pain: FACES Scale, Pretreatment 0-->no hurt (P)   -AO    Posttreatment Pain Rating 0-->no hurt (P)   -AO       Oral Motor Structure and Function    Dentition Assessment natural, present and adequate (P)   -AO    Secretion Management WNL/WFL (P)   -AO    Mucosal Quality moist, healthy (P)   -AO       Oral Musculature and Cranial Nerve Assessment    Oral Motor General Assessment unable to assess (P)   -AO       MBS/VFSS    Utensils Used spoon;cup;straw (P)   -AO    Consistencies Trialed soft to chew textures;chopped;mixed consistency;pureed;thin liquids (P)   -AO       MBS/VFSS Interpretation    VFSS Summary VFSS completed in conjunction w/ Dr Ambrosio. View compromised due to pt positioning and movement; however, airway able to be viewed during study.  Pt demonstrated transient penetration of thin/mixed on one occasion which cleared with the swallow.  Further presentation with no penetration.  No aspiration observed across consistencies.  Prominent cricopharyngeus/diverticulum noted which backflow of portion of bolus into pyriforms with eventual clearing using spontaneous repeat swallows.  Recommend thins via small/standard size straw, mechanical soft chopped (dime to nickel sized), meds whole/crushed as tolerated, small bites and sips.  Reviewed evaluation results with patient, sister, and caregivers.  Educated family on cricopharyngeus/diverticulum and potentional risk of choking/penetration/aspiration due to  backflow.  Provided education on swallow precautions and modified diet recommendations.  Patient, sister, and caregivers verbalized understanding.  -       SLP Communication to Radiology    Summary Statement VFSS completed in conjunction w/ Dr Ambrosio. View compromised due to pt positioning and movement; however, airway able to be viewed during study.  Pt demonstrated transeint penetration of thin/mixed on one occasion which cleared with the swallow.  Further presentation with no penetration.  No aspiration observed across consistencies.  Prominent cricopharyngeus/diverticulum noted which backflow of portion of bolus into pyriforms with eventual clearing using spontaneous repeat swallows.  -       SLP Evaluation Clinical Impression    SLP Swallowing Diagnosis mild-moderate;pharyngeal dysphagia (P)   -AO    Functional Impact risk of aspiration/pneumonia (P)   -AO    Rehab Potential/Prognosis, Swallowing good, to achieve stated therapy goals (P)   -AO    Swallow Criteria for Skilled Therapeutic Interventions Met demonstrates skilled criteria (P)   -AO       Recommendations    Therapy Frequency (Swallow) evaluation only (P)   -AO    SLP Diet Recommendation soft to chew textures;chopped;thin liquids (P)   -AO    Recommended Precautions and Strategies upright posture during/after eating;small bites of food and sips of liquid  liquids with small/standard sized straw; monitored while eating; dime to nickel sized pieces; seperate bowls/divided plate; small size utensils; consider sip controlled cup such as Provale or Bionix Safe straw  -    Oral Care Recommendations Oral Care BID/PRN (P)   -AO    SLP Rec. for Method of Medication Administration meds whole;meds crushed;as tolerated (P)   -AO    Monitor for Signs of Aspiration yes;notify SLP if any concerns (P)   -AO    Anticipated Discharge Disposition (SLP) unknown (P)   -AO          User Key  (r) = Recorded By, (t) = Taken By, (c) = Cosigned By    Initials Name  Provider Type    Mana Muniz MS CCC-SLP Speech and Language Pathologist    Gaye Alva, Speech Therapy Student SLP Student                               OP SLP Education     Row Name 03/07/23 1606       Education    Barriers to Learning No barriers identified  -    Education Provided Described results of evaluation;Family/caregivers expressed understanding of evaluation  -    Assessed Learning needs;Learning motivation  -    Learning Motivation Strong  -    Learning Method Explanation  -    Teaching Response Verbalized understanding  -    Education Comments VFSS and rec's reviewed with caregivers and sister  -          User Key  (r) = Recorded By, (t) = Taken By, (c) = Cosigned By    Initials Name Effective Dates    Mana Muniz MS CCC-SLP 06/01/22 -                          Time Calculation:   SLP Start Time: 1315    Therapy Charges for Today     Code Description Service Date Service Provider Modifiers Qty    [id number] HC ST MOTION FLUORO EVAL SWALLOW 5 3/7/2023 Mana Lopez MS CCC-SLP GN 1                   Mana Lopez MS CCC-SLP  3/7/2023

## 2023-03-08 DIAGNOSIS — N39.0 URINARY TRACT INFECTION WITHOUT HEMATURIA, SITE UNSPECIFIED: Primary | ICD-10-CM

## 2023-03-09 ENCOUNTER — TELEPHONE (OUTPATIENT)
Dept: FAMILY MEDICINE CLINIC | Facility: CLINIC | Age: 58
End: 2023-03-09
Payer: MEDICARE

## 2023-03-09 NOTE — TELEPHONE ENCOUNTER
Caregiver for pt would like to know if she can cut his fexafenadine and amoxil in half. Please call back to advise

## 2023-03-20 RX ORDER — DOCUSATE SODIUM 100 MG/1
100 CAPSULE, LIQUID FILLED ORAL 2 TIMES DAILY
Qty: 60 CAPSULE | Refills: 1 | Status: SHIPPED | OUTPATIENT
Start: 2023-03-20

## 2023-03-20 NOTE — TELEPHONE ENCOUNTER
Caller: [facility] - [place], [place] - [address] [phone]  - [phone]    Relationship: Pharmacy    Best call back number: [phone]    Requested Prescriptions:   Requested Prescriptions     Pending Prescriptions Disp Refills   • docusate sodium (COLACE) 100 MG capsule 60 capsule 1     Sig: Take 1 capsule by mouth 2 (Two) Times a Day.        Pharmacy where request should be sent:      Fabiola Tipton Rep   03/20/23 14:26 EDT

## 2023-04-11 RX ORDER — AMOXICILLIN 500 MG/1
1 CAPSULE ORAL EVERY 12 HOURS SCHEDULED
COMMUNITY
Start: 2023-03-23

## 2023-04-11 RX ORDER — BACLOFEN 20 MG/1
TABLET ORAL
Qty: 120 TABLET | Refills: 1 | Status: SHIPPED | OUTPATIENT
Start: 2023-04-11

## 2023-04-11 RX ORDER — TAMSULOSIN HYDROCHLORIDE 0.4 MG/1
1 CAPSULE ORAL DAILY
COMMUNITY
Start: 2023-03-23

## 2023-04-11 RX ORDER — DOCUSATE SODIUM 100 MG/1
CAPSULE, LIQUID FILLED ORAL
Qty: 60 CAPSULE | Refills: 1 | Status: SHIPPED | OUTPATIENT
Start: 2023-04-11

## 2023-04-11 RX ORDER — NICOTINE 14MG/24HR
1 PATCH, TRANSDERMAL 24 HOURS TRANSDERMAL DAILY
COMMUNITY
Start: 2023-03-13

## 2023-04-11 RX ORDER — PEDIATRIC MULTIVITAMIN NO.17
1 TABLET,CHEWABLE ORAL DAILY
COMMUNITY
Start: 2023-03-13

## 2023-05-03 RX ORDER — DOCUSATE SODIUM 100 MG/1
100 CAPSULE, LIQUID FILLED ORAL 2 TIMES DAILY
Qty: 60 CAPSULE | Refills: 11 | Status: SHIPPED | OUTPATIENT
Start: 2023-05-03

## 2023-05-03 RX ORDER — PEDIATRIC MULTIVITAMIN NO.17
1 TABLET,CHEWABLE ORAL DAILY
Qty: 36 TABLET | Refills: 11 | Status: SHIPPED | OUTPATIENT
Start: 2023-05-03

## 2023-05-10 DIAGNOSIS — L98.9 SKIN LESION OF BACK: ICD-10-CM

## 2023-05-10 DIAGNOSIS — J30.2 SEASONAL ALLERGIES: ICD-10-CM

## 2023-05-10 RX ORDER — OXYMETAZOLINE HYDROCHLORIDE 0.05 G/100ML
2 SPRAY NASAL 2 TIMES DAILY PRN
Qty: 30 ML | Refills: 6 | Status: SHIPPED | OUTPATIENT
Start: 2023-05-10

## 2023-05-10 RX ORDER — GUAIFENESIN 600 MG/1
600 TABLET, EXTENDED RELEASE ORAL 2 TIMES DAILY
Qty: 60 TABLET | Refills: 11 | Status: SHIPPED | OUTPATIENT
Start: 2023-05-10 | End: 2024-05-09

## 2023-05-10 RX ORDER — IBUPROFEN 200 MG
TABLET ORAL 3 TIMES DAILY PRN
Qty: 28 G | Refills: 11 | Status: SHIPPED | OUTPATIENT
Start: 2023-05-10

## 2023-05-17 RX ORDER — GUAIFENESIN 200 MG/1
400 TABLET ORAL EVERY 4 HOURS PRN
Qty: 30 TABLET | Refills: 11 | Status: SHIPPED | OUTPATIENT
Start: 2023-05-17

## 2023-05-30 ENCOUNTER — TELEPHONE (OUTPATIENT)
Dept: FAMILY MEDICINE CLINIC | Facility: CLINIC | Age: 58
End: 2023-05-30
Payer: MEDICARE

## 2023-06-06 RX ORDER — BACLOFEN 20 MG/1
TABLET ORAL
Qty: 120 TABLET | Refills: 1 | Status: SHIPPED | OUTPATIENT
Start: 2023-06-06

## 2023-06-08 RX ORDER — DOCUSATE SODIUM 100 MG/1
100 CAPSULE, LIQUID FILLED ORAL 2 TIMES DAILY
Qty: 60 CAPSULE | Refills: 11 | Status: SHIPPED | OUTPATIENT
Start: 2023-06-08

## 2023-07-14 ENCOUNTER — TELEPHONE (OUTPATIENT)
Dept: FAMILY MEDICINE CLINIC | Facility: CLINIC | Age: 58
End: 2023-07-14

## 2023-07-14 NOTE — TELEPHONE ENCOUNTER
Caller: Marsha Almeida    Relationship: Emergency Contact    Best call back number: [phone]     What is the best time to reach you: ANY    Who are you requesting to speak with (clinical staff, provider,  specific staff member): STEFANI    What was the call regarding: PATIENT'S SISTER IS REQUESTING A CALL BACK TO BE ADVISED ON WHETHER THE PATIENT HAS PNEUMONIA OR NOT AND WHAT NEEDS TO BE DONE.     Is it okay if the provider responds through MyChart: NO

## 2023-07-17 ENCOUNTER — TELEPHONE (OUTPATIENT)
Dept: FAMILY MEDICINE CLINIC | Facility: CLINIC | Age: 58
End: 2023-07-17

## 2023-07-17 NOTE — TELEPHONE ENCOUNTER
Informed health care coordinator, Suresh Garcia, that CXR needs to be repeated in 4-6 weeks. They will have someone from the [facility] transport him to the [facility] for this. Order is pending for you to sign off on.

## 2023-07-28 RX ORDER — NICOTINE 14MG/24HR
PATCH, TRANSDERMAL 24 HOURS TRANSDERMAL
Qty: 265 CAPSULE | Refills: 11 | Status: SHIPPED | OUTPATIENT
Start: 2023-07-28

## 2023-08-14 ENCOUNTER — HOSPITAL ENCOUNTER (OUTPATIENT)
Dept: GENERAL RADIOLOGY | Facility: HOSPITAL | Age: 58
Discharge: HOME | End: 2023-08-14
Admitting: NURSE PRACTITIONER
Payer: MEDICARE

## 2023-08-14 DIAGNOSIS — R06.02 SHORTNESS OF BREATH: ICD-10-CM

## 2023-08-14 DIAGNOSIS — R93.89 ABNORMAL CHEST X-RAY: ICD-10-CM

## 2023-08-14 DIAGNOSIS — R06.2 WHEEZING: Primary | ICD-10-CM

## 2023-08-14 DIAGNOSIS — R06.2 WHEEZING: ICD-10-CM

## 2023-08-14 PROCEDURE — 71046 X-RAY EXAM CHEST 2 VIEWS: CPT

## 2023-09-06 RX ORDER — BACLOFEN 20 MG/1
TABLET ORAL
Qty: 120 TABLET | Refills: 1 | Status: SHIPPED | OUTPATIENT
Start: 2023-09-06

## 2023-09-12 ENCOUNTER — HOSPITAL ENCOUNTER (OUTPATIENT)
Dept: GENERAL RADIOLOGY | Facility: HOSPITAL | Age: 58
Discharge: HOME | End: 2023-09-12
Admitting: FAMILY MEDICINE
Payer: MEDICARE

## 2023-09-12 DIAGNOSIS — J90 PLEURAL EFFUSION: Primary | ICD-10-CM

## 2023-09-12 DIAGNOSIS — J90 PLEURAL EFFUSION: ICD-10-CM

## 2023-09-12 PROCEDURE — 71046 X-RAY EXAM CHEST 2 VIEWS: CPT

## 2023-10-11 RX ORDER — OXYBUTYNIN CHLORIDE 5 MG/1
TABLET, EXTENDED RELEASE ORAL
Qty: 63 TABLET | Refills: 3 | Status: SHIPPED | OUTPATIENT
Start: 2023-10-11

## 2023-10-18 ENCOUNTER — FLU SHOT (OUTPATIENT)
Dept: FAMILY MEDICINE CLINIC | Facility: CLINIC | Age: 58
End: 2023-10-18
Payer: MEDICARE

## 2023-10-18 DIAGNOSIS — Z23 NEED FOR VACCINATION: Primary | ICD-10-CM

## 2023-10-18 PROCEDURE — G0008 ADMIN INFLUENZA VIRUS VAC: HCPCS | Performed by: FAMILY MEDICINE

## 2023-10-18 PROCEDURE — 90686 IIV4 VACC NO PRSV 0.5 ML IM: CPT | Performed by: FAMILY MEDICINE

## 2023-10-19 RX ORDER — BACLOFEN 20 MG/1
TABLET ORAL
Qty: 120 TABLET | Refills: 1 | Status: SHIPPED | OUTPATIENT
Start: 2023-10-19

## 2023-11-10 RX ORDER — OMEPRAZOLE 40 MG/1
CAPSULE, DELAYED RELEASE ORAL
Qty: 90 CAPSULE | Refills: 2 | Status: SHIPPED | OUTPATIENT
Start: 2023-11-10

## 2023-11-16 RX ORDER — IBUPROFEN 200 MG
200 TABLET ORAL EVERY 8 HOURS PRN
Qty: 28 TABLET | Refills: 11 | Status: SHIPPED | OUTPATIENT
Start: 2023-11-16

## 2023-11-16 RX ORDER — DIAPER,BRIEF,INFANT-TODD,DISP
EACH MISCELLANEOUS
Qty: 28 G | Refills: 11 | Status: SHIPPED | OUTPATIENT
Start: 2023-11-16

## 2023-11-16 RX ORDER — CALCIUM CARBONATE 500 MG/1
TABLET, CHEWABLE ORAL
Qty: 30 TABLET | Refills: 11 | Status: SHIPPED | OUTPATIENT
Start: 2023-11-16

## 2023-12-14 RX ORDER — BACLOFEN 20 MG/1
TABLET ORAL
Qty: 120 TABLET | Refills: 1 | Status: SHIPPED | OUTPATIENT
Start: 2023-12-14

## 2024-02-09 ENCOUNTER — OFFICE VISIT (OUTPATIENT)
Dept: FAMILY MEDICINE CLINIC | Facility: CLINIC | Age: 59
End: 2024-02-09
Payer: MEDICARE

## 2024-02-09 VITALS
SYSTOLIC BLOOD PRESSURE: 120 MMHG | DIASTOLIC BLOOD PRESSURE: 80 MMHG | TEMPERATURE: 99.1 F | HEART RATE: 80 BPM | RESPIRATION RATE: 18 BRPM | OXYGEN SATURATION: 99 %

## 2024-02-09 DIAGNOSIS — R05.1 ACUTE COUGH: ICD-10-CM

## 2024-02-09 DIAGNOSIS — J06.9 UPPER RESPIRATORY TRACT INFECTION, UNSPECIFIED TYPE: Primary | ICD-10-CM

## 2024-02-09 PROBLEM — Z99.3 DEPENDENCE ON WHEELCHAIR: Status: ACTIVE | Noted: 2022-09-29

## 2024-02-09 LAB
EXPIRATION DATE: NORMAL
FLUAV AG UPPER RESP QL IA.RAPID: NOT DETECTED
FLUBV AG UPPER RESP QL IA.RAPID: NOT DETECTED
INTERNAL CONTROL: NORMAL
Lab: NORMAL
SARS-COV-2 AG UPPER RESP QL IA.RAPID: NOT DETECTED

## 2024-02-09 RX ORDER — AZITHROMYCIN 250 MG/1
TABLET, FILM COATED ORAL
Qty: 6 TABLET | Refills: 0 | Status: SHIPPED | OUTPATIENT
Start: 2024-02-09

## 2024-02-09 NOTE — PROGRESS NOTES
Subjective   Suresh Alcantar is a 59 y.o. male.   Cough    History of Present Illness  Carlos A is here w/ Caregiver, Manny.  Manny states he started coughing last Friday.  Cough has worsened and he is becoming weaker.  The have been using Mucinex.  He has not been using any other over-the-counter medication.  In the office today he is only coughed once or twice but is clearly not feeling well and I think the congestion is the worst of it.  His caregiver is watching him carefully to make sure this does not progress.    Review of Systems   Constitutional:  Negative for chills, fever and unexpected weight loss.   HENT:  Positive for rhinorrhea. Negative for ear pain, postnasal drip and sore throat.    Respiratory:  Positive for cough. Negative for shortness of breath and wheezing.    Cardiovascular:  Negative for chest pain.   Musculoskeletal:  Negative for myalgias.   Skin:  Negative for rash.       Objective   Vitals:    02/09/24 1125   BP: 120/80   Pulse: 80   Resp: 18   Temp: 99.1 °F (37.3 °C)   SpO2: 99%   Weight: Comment: wheelchair      There is no height or weight on file to calculate BMI.  BMI is within normal parameters. No other follow-up for BMI required.     Physical Exam  Vitals and nursing note reviewed.   Constitutional:       Appearance: Normal appearance. He is well-developed and normal weight.   HENT:      Head: Normocephalic and atraumatic.      Right Ear: External ear normal.      Left Ear: External ear normal.      Nose: Nose normal.      Mouth/Throat:      Pharynx: No oropharyngeal exudate.   Eyes:      General:         Right eye: No discharge.         Left eye: No discharge.      Conjunctiva/sclera: Conjunctivae normal.      Pupils: Pupils are equal, round, and reactive to light.   Neck:      Thyroid: No thyromegaly.   Cardiovascular:      Rate and Rhythm: Normal rate and regular rhythm.      Heart sounds: Normal heart sounds. No murmur heard.  Pulmonary:      Effort: Pulmonary effort is  normal. No tachypnea or respiratory distress.      Breath sounds: Normal breath sounds. No stridor. No decreased breath sounds, wheezing, rhonchi or rales.   Chest:      Chest wall: No tenderness.   Musculoskeletal:      Cervical back: Normal range of motion and neck supple.   Lymphadenopathy:      Cervical: No cervical adenopathy.   Skin:     General: Skin is warm and dry.   Neurological:      Mental Status: He is alert and oriented to person, place, and time.   Psychiatric:         Behavior: Behavior normal.         Thought Content: Thought content normal.         Judgment: Judgment normal.           Assessment & Plan     Problem List Items Addressed This Visit    None  Visit Diagnoses       Upper respiratory tract infection, unspecified type    -  Primary  He is clearly uncomfortable in his wheelchair today and his examination did not notice any shortness of breath or any changes in lung function.  His lungs were clear.  He has a runny nose and he only coughed occasionally in the office today.  I have started him on a Z-Leonard and discussed with his caregiver what to watch for and to feel free to call us if he has any concerns.  If he gets worse over the weekend, I have advised caregiver to make sure he goes to the ER.  In the meantime I have encouraged over-the-counter medications to help with the cough and as much fluid as he can tolerate.      Relevant Medications    azithromycin (Zithromax Z-Leonard) 250 MG tablet               No orders of the defined types were placed in this encounter.       No follow-ups on file.   Answers submitted by the patient for this visit:  Primary Reason for Visit (Submitted on 2/9/2024)  What is the primary reason for your visit?: Cough

## 2024-02-21 RX ORDER — BACLOFEN 20 MG/1
TABLET ORAL
Qty: 120 TABLET | Refills: 1 | Status: SHIPPED | OUTPATIENT
Start: 2024-02-21

## 2024-03-14 ENCOUNTER — OFFICE VISIT (OUTPATIENT)
Dept: FAMILY MEDICINE CLINIC | Facility: CLINIC | Age: 59
End: 2024-03-14
Payer: MEDICARE

## 2024-03-14 VITALS
OXYGEN SATURATION: 95 % | SYSTOLIC BLOOD PRESSURE: 130 MMHG | RESPIRATION RATE: 18 BRPM | HEART RATE: 84 BPM | DIASTOLIC BLOOD PRESSURE: 80 MMHG

## 2024-03-14 DIAGNOSIS — Z00.00 MEDICARE ANNUAL WELLNESS VISIT, SUBSEQUENT: Primary | ICD-10-CM

## 2024-03-14 DIAGNOSIS — Z13.1 SCREENING FOR DIABETES MELLITUS: ICD-10-CM

## 2024-03-14 DIAGNOSIS — Z99.3 DEPENDENCE ON WHEELCHAIR: ICD-10-CM

## 2024-03-14 DIAGNOSIS — Z79.899 HIGH RISK MEDICATION USE: ICD-10-CM

## 2024-03-14 DIAGNOSIS — Z23 NEED FOR VACCINATION: ICD-10-CM

## 2024-03-14 DIAGNOSIS — G80.1 SPASTIC DIPLEGIC CEREBRAL PALSY: Chronic | ICD-10-CM

## 2024-03-14 DIAGNOSIS — Z12.5 SCREENING FOR PROSTATE CANCER: ICD-10-CM

## 2024-03-14 DIAGNOSIS — Z13.6 SCREENING FOR ISCHEMIC HEART DISEASE: ICD-10-CM

## 2024-03-14 NOTE — PROGRESS NOTES
Medicare Subsequent Wellness Visit  Subjective   Suresh Alcantar is a 59 y.o. male who presents for an Medicare Wellness Visit.   Patient Care Team:  Naresh Gonzalez MD as PCP - General    Recent Hospitalizations:  none    Age-appropriate Screening Schedule:  Refer to the list below for future screening recommendations based on patient's age. The patient has been provided with a written plan.    Health Maintenance   Topic Date Due    LIPID PANEL  04/05/2020    ZOSTER VACCINE (1 of 2) 03/14/2024 (Originally 1/19/2015)    ANNUAL WELLNESS VISIT  03/14/2025    TDAP/TD VACCINES (2 - Td or Tdap) 12/13/2026    COLORECTAL CANCER SCREENING  12/13/2026    HEPATITIS C SCREENING  Completed    COVID-19 Vaccine  Completed    INFLUENZA VACCINE  Completed    Pneumococcal Vaccine 0-64  Aged Out     Outpatient Medications Prior to Visit   Medication Sig Dispense Refill    24HR Allergy Relief 180 MG tablet TAKE ONE TABLET BY MOUTH EVERY DAY 90 tablet 3    acetaminophen (TYLENOL) 500 MG tablet Take 1 tablet by mouth Every 6 (Six) Hours As Needed for mild pain (1-3). 90 tablet 3    albuterol sulfate  (90 Base) MCG/ACT inhaler Inhale 2 puffs Every 4 (Four) Hours As Needed for Wheezing. 18 g 2    azithromycin (Zithromax Z-Leonard) 250 MG tablet Take 2 tablets the first day, then 1 tablet daily for 4 days. 6 tablet 0    baclofen (LIORESAL) 20 MG tablet TAKE ONE TABLET BY MOUTH FOUR TIMES DAILY 120 tablet 1    calcium carbonate (TUMS) 500 MG chewable tablet CHEW ONE TABLET THREE TIMES DAILY 30 tablet 11    Cold Sore Products (Blistex ointment) ointment Apply 1 application topically 2 (Two) Times a Day As Needed (lip dryness). 10 g 11    docusate sodium (COLACE) 100 MG capsule Take 1 capsule by mouth 2 (Two) Times a Day. 60 capsule 11    guaiFENesin (MUCINEX) 600 MG 12 hr tablet Take 1 tablet by mouth 2 (Two) Times a Day. 60 tablet 11    guaiFENesin 200 MG tablet Take 2 tablets by mouth Every 4 (Four) Hours As Needed for Cough. 30  tablet 11    hydrocortisone 1 % cream APPLY ONE application TOPICALLY TO THE AFFECTED AREA AS directed TWICE DAILY 28 g 11    ibuprofen (ADVIL,MOTRIN) 200 MG tablet TAKE ONE TABLET BY MOUTH EVERY 8 HOURS AS NEEDED FOR mild PAIN 28 tablet 11    neomycin-bacitracin-polymyxin (NEOSPORIN) 5-400-5000 ointment Apply  topically to the appropriate area as directed 3 (Three) Times a Day As Needed for Irritation. 28 g 11    omeprazole (priLOSEC) 40 MG capsule TAKE ONE CAPSULE BY MOUTH AT BEDTIME 90 capsule 2    oxybutynin XL (DITROPAN-XL) 5 MG 24 hr tablet TAKE ONE TABLET BY MOUTH AT BEDTIME 63 tablet 3    oxymetazoline (AFRIN) 0.05 % nasal spray 2 sprays into the nostril(s) as directed by provider 2 (Two) Times a Day As Needed for Congestion. 30 mL 6    Pediatric Multiple Vit-C-FA (multivitamin with C  & folic acid) with C & FA chewable tablet chewable tablet TAKE ONE TABLET BY MOUTH DAILY 36 tablet 11    Pediatric Multiple Vitamins (Multivitamin Childrens) chewable tablet Chew 1 tablet Daily. 36 tablet 11    Probiotic Product (Advanced Probiotic) capsule Take 1 capsule by mouth Daily. 265 capsule 2    Saccharomyces boulardii (Probiotic) 250 MG capsule TAKE ONE CAPSULE BY MOUTH DAILY 265 capsule 11    benzonatate (TESSALON) 100 MG capsule Take 1 capsule by mouth. (Patient not taking: Reported on 7/11/2023)      tamsulosin (FLOMAX) 0.4 MG capsule 24 hr capsule Take 1 capsule by mouth Daily.       No facility-administered medications prior to visit.      Patient Active Problem List   Diagnosis    Spastic diplegic cerebral palsy    COVID-19 virus detected    Cerebral palsy    Dependence on wheelchair     Depression Screen:       3/14/2024    12:56 PM   PHQ-2/PHQ-9 Depression Screening   Little Interest or Pleasure in Doing Things 0-->not at all   Feeling Down, Depressed or Hopeless 0-->not at all   PHQ-9: Brief Depression Severity Measure Score 0       Functional and Cognitive Screening:      3/14/2024    12:56 PM   Functional  & Cognitive Status   Do you have difficulty preparing food and eating? Yes   Do you have difficulty bathing yourself, getting dressed or grooming yourself? Yes   Do you have difficulty using the toilet? Yes   Do you have difficulty moving around from place to place? Yes   Do you have trouble with steps or getting out of a bed or a chair? Yes   Current Diet Well Balanced Diet   Dental Exam Up to date   Eye Exam Up to date   Exercise (times per week) 0 times per week   Current Exercises Include No Regular Exercise        Exercise Comment Bowling once a week   Do you need help using the phone?  Yes   Are you deaf or do you have serious difficulty hearing?  No   Do you need help to go to places out of walking distance? Yes   Do you need help shopping? Yes   Do you need help preparing meals?  Yes   Do you need help with housework?  Yes   Do you need help with laundry? Yes   Do you need help taking your medications? Yes   Do you need help managing money? Yes   Do you ever drive or ride in a car without wearing a seat belt? No   Have you felt unusual stress, anger or loneliness in the last month? No   Who do you live with? Other   If you need help, do you have trouble finding someone available to you? No   Have you been bothered in the last four weeks by sexual problems? No   Do you have difficulty concentrating, remembering or making decisions? Yes     Does the patient have evidence of cognitive impairment?  None    Compared to one year ago, the patient feels their physical health and mental health are the same.     BMI is within normal parameters. No other follow-up for BMI required.       Objective     Vitals:    03/14/24 1251   BP: 130/80   Pulse: 84   Resp: 18   SpO2: 95%   Weight: Comment: Wheelchair   Height: Comment: Wheelchair     There is no height or weight on file to calculate BMI.    Follow Up:  Medicare Well visit in one year    ADVANCED DIRECTIVES:   ACP discussion was held with the patient during this  visit. Patient has an advance directive in EMR which is still valid.     An After Visit Summary and PPPS with all of these plans were given to the patient.     Additional E&M Note during same encounter follows:  Patient has multiple medical problems which are significant and separately identifiable that require additional work above and beyond the Medicare Wellness Visit.      Subjective   Suresh Alcantar is a 59 y.o. male who also presents for Annual Exam (Subsequent Medicare Exam)   HPI  Carlos A is in a motorized chair and with a guardian today.    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 He has a roommate who helps care for him.He has a very active social life. His medical issues are stable. He is clean, skin is in good condition and he is very happy.  Carlos A has to use a straw.  At one point there was an attempt to try using a thickener with liquids but Carlos A rejected that idea and the family has decided that he is doing well enough on regular liquids. I have suggested a swallow study in the past but the family determined that it was not necessary at this time.    He is enjoying doing things like bowling and spending time with friends.        Review of Systems  Review of Systems  Constitutional: Negative for activity change, appetite change, fatigue and fever.   HENT: Negative for sinus of pain and sore throat.    Respiratory: Negative for cough and shortness of breath.    Musculoskeletal:        Unable to ambulate or use both arms effectively.   Neurological: Positive for tremors, speech difficulty and weakness. Negative for headaches.   Psychiatric/Behavioral: Negative.       PHYSICAL EXAM  Vitals reviewed   HEENT: PERRLA, EOMI. Oral mucosa moist,   No LAD.  CV: RRR, no murmurs, rubs, clicks or gallops  LUNGS: CTA bilaterally  EXT: No edema, impaired function of bilateral upper and lower ext  NEURO: CN II - XII grossly intact  PSYCH: good mood, positive  affect, alert and engaged. No thoughts of self harm  expressed.  He does not like getting shots and let us know loud and clear.    Assessment & Plan   Problem List Items Addressed This Visit          Neuro    Spastic diplegic cerebral palsy (Chronic)       Symptoms and Signs    Dependence on wheelchair     Other Visit Diagnoses       Medicare annual wellness visit, subsequent    -  Primary    Screening for diabetes mellitus        Relevant Orders    Comprehensive Metabolic Panel    Screening for ischemic heart disease        Relevant Orders    Lipid Panel With LDL / HDL Ratio    High risk medication use        Relevant Orders    Comprehensive Metabolic Panel    CBC (No Diff)    Screening for prostate cancer        Relevant Orders    PSA Screen    Need for vaccination        Relevant Orders    COVID-19 F23 (Pfizer) 12yrs+ (COMIRNATY) (Completed)               Orders Placed This Encounter   Procedures    COVID-19 F23 (Pfizer) 12yrs+ (COMIRNATY)    Lipid Panel With LDL / HDL Ratio    Comprehensive Metabolic Panel    CBC (No Diff)    PSA Screen        Return in about 6 months (around 9/14/2024) for Recheck.

## 2024-03-14 NOTE — PATIENT INSTRUCTIONS
Medicare Wellness  Personal Prevention Plan of Service     Date of Office Visit:    Encounter Provider:  Naresh Gonzalez MD  Place of Service:  [facility]  Patient Name: Suresh Alcantar  :  1965    As part of the Medicare Wellness portion of your visit today, we are providing you with this personalized preventive plan of services (PPPS). This plan is based upon recommendations of the [place] Preventive Services Task Force (USPSTF) and the Advisory Committee on Immunization Practices (ACIP).    This lists the preventive care services that should be considered, and provides dates of when you are due. Items listed as completed are up-to-date and do not require any further intervention.    Health Maintenance   Topic Date Due    LIPID PANEL  2020    COVID-19 Vaccine (2023- season) 2023    ZOSTER VACCINE (1 of 2) 2024 (Originally 2015)    ANNUAL WELLNESS VISIT  2025    TDAP/TD VACCINES (2 - Td or Tdap) 2026    COLORECTAL CANCER SCREENING  2026    HEPATITIS C SCREENING  Completed    INFLUENZA VACCINE  Completed    Pneumococcal Vaccine 0-64  Aged Out       Orders Placed This Encounter   Procedures    COVID-19 F23 (Pfizer) 12yrs+ (COMIRNATY)    Lipid Panel With LDL / HDL Ratio     Order Specific Question:   Release to patient     Answer:   Routine Release [[id number]]    Comprehensive Metabolic Panel     Order Specific Question:   Release to patient     Answer:   Routine Release [[id number]]    CBC (No Diff)     Order Specific Question:   Release to patient     Answer:   Routine Release [[id number]]    PSA Screen     Order Specific Question:   Release to patient     Answer:   Routine Release [[id number]]       Return in about 6 months (around 2024).

## 2024-03-15 LAB
ALBUMIN SERPL-MCNC: 4.2 G/DL (ref 3.8–4.9)
ALBUMIN/GLOB SERPL: 1.4 {RATIO} (ref 1.2–2.2)
ALP SERPL-CCNC: 71 IU/L (ref 44–121)
ALT SERPL-CCNC: 13 IU/L (ref 0–44)
AST SERPL-CCNC: 32 IU/L (ref 0–40)
BILIRUB SERPL-MCNC: 0.3 MG/DL (ref 0–1.2)
BUN SERPL-MCNC: 10 MG/DL (ref 6–24)
BUN/CREAT SERPL: 12 (ref 9–20)
CALCIUM SERPL-MCNC: 9.8 MG/DL (ref 8.7–10.2)
CHLORIDE SERPL-SCNC: 101 MMOL/L (ref 96–106)
CHOLEST SERPL-MCNC: 161 MG/DL (ref 100–199)
CO2 SERPL-SCNC: 19 MMOL/L (ref 20–29)
CREAT SERPL-MCNC: 0.86 MG/DL (ref 0.76–1.27)
EGFRCR SERPLBLD CKD-EPI 2021: 100 ML/MIN/1.73
GLOBULIN SER CALC-MCNC: 3 G/DL (ref 1.5–4.5)
GLUCOSE SERPL-MCNC: 91 MG/DL (ref 70–99)
HDLC SERPL-MCNC: 74 MG/DL
LDLC SERPL CALC-MCNC: 74 MG/DL (ref 0–99)
LDLC/HDLC SERPL: 1 RATIO (ref 0–3.6)
POTASSIUM SERPL-SCNC: ABNORMAL MMOL/L
PROT SERPL-MCNC: 7.2 G/DL (ref 6–8.5)
PSA SERPL-MCNC: 1.2 NG/ML (ref 0–4)
SODIUM SERPL-SCNC: 142 MMOL/L (ref 134–144)
TRIGL SERPL-MCNC: 64 MG/DL (ref 0–149)
VLDLC SERPL CALC-MCNC: 13 MG/DL (ref 5–40)

## 2024-04-10 RX ORDER — BACLOFEN 20 MG/1
TABLET ORAL
Qty: 120 TABLET | Refills: 1 | Status: SHIPPED | OUTPATIENT
Start: 2024-04-10

## 2024-05-29 DIAGNOSIS — J30.2 SEASONAL ALLERGIES: ICD-10-CM

## 2024-05-29 RX ORDER — FEXOFENADINE HYDROCHLORIDE 180 MG/1
TABLET ORAL
Qty: 90 TABLET | Refills: 0 | Status: SHIPPED | OUTPATIENT
Start: 2024-05-29

## 2024-05-29 RX ORDER — PEDIATRIC MULTIVITAMIN NO.17
1 TABLET,CHEWABLE ORAL DAILY
Qty: 36 TABLET | Refills: 11 | Status: SHIPPED | OUTPATIENT
Start: 2024-05-29

## 2024-05-29 RX ORDER — DOCUSATE SODIUM 100 MG/1
100 CAPSULE, LIQUID FILLED ORAL 2 TIMES DAILY
Qty: 60 CAPSULE | Refills: 11 | Status: SHIPPED | OUTPATIENT
Start: 2024-05-29

## 2024-06-11 DIAGNOSIS — J30.2 SEASONAL ALLERGIES: ICD-10-CM

## 2024-06-11 RX ORDER — BACLOFEN 20 MG/1
TABLET ORAL
Qty: 120 TABLET | Refills: 6 | Status: SHIPPED | OUTPATIENT
Start: 2024-06-11

## 2024-06-11 RX ORDER — OXYMETAZOLINE HCL 0.05 %
SPRAY, NON-AEROSOL (ML) NASAL
Qty: 30 ML | Refills: 6 | Status: SHIPPED | OUTPATIENT
Start: 2024-06-11 | End: 2024-06-14

## 2024-06-11 RX ORDER — GUAIFENESIN 200 MG/1
TABLET ORAL
Qty: 30 TABLET | Refills: 6 | Status: SHIPPED | OUTPATIENT
Start: 2024-06-11 | End: 2024-06-14

## 2024-06-11 RX ORDER — OXYBUTYNIN CHLORIDE 5 MG/1
TABLET, EXTENDED RELEASE ORAL
Qty: 63 TABLET | Refills: 6 | Status: SHIPPED | OUTPATIENT
Start: 2024-06-11

## 2024-06-14 DIAGNOSIS — J30.2 SEASONAL ALLERGIES: ICD-10-CM

## 2024-06-14 RX ORDER — OXYMETAZOLINE HCL 0.05 %
SPRAY, NON-AEROSOL (ML) NASAL
Qty: 30 ML | Refills: 6 | Status: SHIPPED | OUTPATIENT
Start: 2024-06-14

## 2024-06-14 RX ORDER — GUAIFENESIN 200 MG/1
TABLET ORAL
Qty: 30 TABLET | Refills: 6 | Status: SHIPPED | OUTPATIENT
Start: 2024-06-14

## 2024-07-23 RX ORDER — OMEPRAZOLE 40 MG/1
CAPSULE, DELAYED RELEASE ORAL
Qty: 90 CAPSULE | Refills: 2 | Status: SHIPPED | OUTPATIENT
Start: 2024-07-23

## 2024-07-24 DIAGNOSIS — R06.02 SHORTNESS OF BREATH: ICD-10-CM

## 2024-07-24 DIAGNOSIS — R06.2 WHEEZING: ICD-10-CM

## 2024-07-24 RX ORDER — ALBUTEROL SULFATE 90 UG/1
2 AEROSOL, METERED RESPIRATORY (INHALATION) EVERY 4 HOURS PRN
Qty: 18 G | Refills: 2 | Status: SHIPPED | OUTPATIENT
Start: 2024-07-24

## 2024-07-24 RX ORDER — NICOTINE 14MG/24HR
PATCH, TRANSDERMAL 24 HOURS TRANSDERMAL
Qty: 265 CAPSULE | Refills: 11 | Status: SHIPPED | OUTPATIENT
Start: 2024-07-24

## 2024-08-14 DIAGNOSIS — J30.2 SEASONAL ALLERGIES: ICD-10-CM

## 2024-08-14 RX ORDER — FEXOFENADINE HYDROCHLORIDE 180 MG/1
TABLET ORAL
Qty: 90 TABLET | Refills: 0 | Status: SHIPPED | OUTPATIENT
Start: 2024-08-14

## 2024-08-16 ENCOUNTER — OFFICE VISIT (OUTPATIENT)
Dept: FAMILY MEDICINE CLINIC | Facility: CLINIC | Age: 59
End: 2024-08-16
Payer: MEDICARE

## 2024-08-16 VITALS
DIASTOLIC BLOOD PRESSURE: 88 MMHG | HEIGHT: 69 IN | RESPIRATION RATE: 18 BRPM | BODY MASS INDEX: 20.08 KG/M2 | SYSTOLIC BLOOD PRESSURE: 116 MMHG

## 2024-08-16 DIAGNOSIS — R39.198 DIFFICULTY URINATING: Primary | ICD-10-CM

## 2024-08-16 LAB
BILIRUB BLD-MCNC: NEGATIVE MG/DL
CLARITY, POC: CLEAR
COLOR UR: YELLOW
GLUCOSE UR STRIP-MCNC: NEGATIVE MG/DL
KETONES UR QL: NEGATIVE
LEUKOCYTE EST, POC: NEGATIVE
NITRITE UR-MCNC: NEGATIVE MG/ML
PH UR: 6.5 [PH] (ref 5–8)
PROT UR STRIP-MCNC: NEGATIVE MG/DL
RBC # UR STRIP: NEGATIVE /UL
SP GR UR: 1.01 (ref 1–1.03)
UROBILINOGEN UR QL: NORMAL

## 2024-08-16 PROCEDURE — 81002 URINALYSIS NONAUTO W/O SCOPE: CPT | Performed by: NURSE PRACTITIONER

## 2024-08-16 PROCEDURE — 99213 OFFICE O/P EST LOW 20 MIN: CPT | Performed by: NURSE PRACTITIONER

## 2024-08-16 PROCEDURE — 1160F RVW MEDS BY RX/DR IN RCRD: CPT | Performed by: NURSE PRACTITIONER

## 2024-08-16 PROCEDURE — 1126F AMNT PAIN NOTED NONE PRSNT: CPT | Performed by: NURSE PRACTITIONER

## 2024-08-16 PROCEDURE — 1159F MED LIST DOCD IN RCRD: CPT | Performed by: NURSE PRACTITIONER

## 2024-08-16 NOTE — PROGRESS NOTES
Subjective   Suresh Alcantar is a 59 y.o. male.     Chief Complaint   Patient presents with    Difficulty Urinating     X2 days. Along with an odor        History of Present Illness   Patient presents with caregiver with c/o difficulty urinating X 2 days. His caregiver reports that he did not urinate over night. He did urinate last night and his caregiver reports  that his urine was cloudy and had a bad odor.     The following portions of the patient's history were reviewed and updated as appropriate: allergies, current medications, past family history, past medical history, past social history, past surgical history and problem list.    Review of Systems   Constitutional:  Negative for chills, fatigue and fever.   Respiratory:  Negative for cough, chest tightness, shortness of breath and wheezing.    Cardiovascular:  Negative for chest pain, palpitations and leg swelling.   Genitourinary:  Positive for difficulty urinating. Negative for dysuria, flank pain, frequency and hematuria.        Strong odor   Neurological:  Negative for dizziness, weakness and headache.   Hematological: Negative.    Psychiatric/Behavioral:  Negative for agitation, behavioral problems and hallucinations.        Objective   Physical Exam  Vitals and nursing note reviewed.   Constitutional:       Appearance: He is well-developed.   HENT:      Head: Normocephalic and atraumatic.      Right Ear: External ear normal.      Left Ear: External ear normal.      Nose: Nose normal.   Eyes:      Conjunctiva/sclera: Conjunctivae normal.      Pupils: Pupils are equal, round, and reactive to light.   Neck:      Thyroid: No thyromegaly.   Cardiovascular:      Rate and Rhythm: Normal rate and regular rhythm.      Heart sounds: Normal heart sounds. No murmur heard.  Pulmonary:      Effort: Pulmonary effort is normal.      Breath sounds: Normal breath sounds.   Abdominal:      General: Bowel sounds are normal. There is no distension.      Palpations:  Abdomen is soft.      Tenderness: There is no abdominal tenderness.   Musculoskeletal:         General: No deformity. Normal range of motion.      Cervical back: Normal range of motion and neck supple.   Lymphadenopathy:      Cervical: No cervical adenopathy.   Skin:     General: Skin is warm and dry.   Neurological:      Mental Status: He is alert and oriented to person, place, and time.      Cranial Nerves: No cranial nerve deficit.   Psychiatric:         Behavior: Behavior normal.         Thought Content: Thought content normal.         Judgment: Judgment normal.         Vitals:    08/16/24 1055   BP: 116/88   Resp: 18     Body mass index is 20.08 kg/m².      Procedures    Assessment & Plan   Problems Addressed this Visit    None  Visit Diagnoses       Difficulty urinating    -  Primary    Relevant Orders    POC Urinalysis Dipstick    Urine Culture - Urine, Urine, Clean Catch          Diagnoses         Codes Comments    Difficulty urinating    -  Primary ICD-10-CM: R39.198  ICD-9-CM: 788.99           POCT urinalysis  Urine culture         Return if symptoms worsen or fail to improve.

## 2024-08-21 LAB
BACTERIA UR CULT: ABNORMAL
OTHER ANTIBIOTIC SUSC ISLT: ABNORMAL

## 2024-08-22 DIAGNOSIS — N39.0 URINARY TRACT INFECTION WITHOUT HEMATURIA, SITE UNSPECIFIED: Primary | ICD-10-CM

## 2024-08-22 RX ORDER — SULFAMETHOXAZOLE AND TRIMETHOPRIM 800; 160 MG/1; MG/1
1 TABLET ORAL 2 TIMES DAILY
Qty: 14 TABLET | Refills: 0 | Status: SHIPPED | OUTPATIENT
Start: 2024-08-22 | End: 2024-08-29

## 2024-11-17 DIAGNOSIS — J30.2 SEASONAL ALLERGIES: ICD-10-CM

## 2024-11-18 RX ORDER — FEXOFENADINE HYDROCHLORIDE 180 MG/1
180 TABLET ORAL DAILY
Qty: 90 TABLET | Refills: 0 | Status: SHIPPED | OUTPATIENT
Start: 2024-11-18

## 2025-02-06 ENCOUNTER — TELEPHONE (OUTPATIENT)
Dept: FAMILY MEDICINE CLINIC | Facility: CLINIC | Age: 60
End: 2025-02-06
Payer: MEDICARE

## 2025-02-06 NOTE — TELEPHONE ENCOUNTER
Spoke with patient's emergency contact. Last AWV was with previous PCP Lisa on 3/14/24. Patient is not due for AWV until 3/15/25 at the earliest. She verbalized an understanding, requested to keep appointment as-is for now.

## 2025-02-06 NOTE — TELEPHONE ENCOUNTER
Caller: Marsha Almeida    Relationship to patient: Emergency Contact    Best call back number: [phone]     Chief complaint: MWV    Type of visit: MWV    Requested date: ASAP     If rescheduling, when is the original appointment: APRIL 8 2025     Additional notes:PATIENT IS NEEDING TO HAVE THIS DONE AS SOON AS POSSIBLE (WITHIN TWO WEEKS) TO KEEP THE SERVICES WITH [facility]. PLEASE CALL AND ADVISE.

## 2025-02-07 DIAGNOSIS — J30.2 SEASONAL ALLERGIES: ICD-10-CM

## 2025-02-07 RX ORDER — FEXOFENADINE HYDROCHLORIDE 180 MG/1
180 TABLET ORAL DAILY
Qty: 90 TABLET | Refills: 0 | Status: SHIPPED | OUTPATIENT
Start: 2025-02-07

## 2025-02-11 RX ORDER — BACLOFEN 20 MG/1
20 TABLET ORAL 4 TIMES DAILY
Qty: 120 TABLET | Refills: 6 | Status: SHIPPED | OUTPATIENT
Start: 2025-02-11

## 2025-04-08 ENCOUNTER — OFFICE VISIT (OUTPATIENT)
Dept: FAMILY MEDICINE CLINIC | Facility: CLINIC | Age: 60
End: 2025-04-08
Payer: MEDICARE

## 2025-04-08 VITALS
SYSTOLIC BLOOD PRESSURE: 132 MMHG | RESPIRATION RATE: 16 BRPM | DIASTOLIC BLOOD PRESSURE: 92 MMHG | BODY MASS INDEX: 20.73 KG/M2 | HEIGHT: 69 IN | WEIGHT: 140 LBS

## 2025-04-08 DIAGNOSIS — G80.1 SPASTIC DIPLEGIC CEREBRAL PALSY: Chronic | ICD-10-CM

## 2025-04-08 DIAGNOSIS — Z00.00 ROUTINE HEALTH MAINTENANCE: Primary | ICD-10-CM

## 2025-04-08 DIAGNOSIS — K21.9 GASTROESOPHAGEAL REFLUX DISEASE WITHOUT ESOPHAGITIS: ICD-10-CM

## 2025-04-08 PROBLEM — J30.2 SEASONAL ALLERGIES: Status: ACTIVE | Noted: 2025-04-08

## 2025-04-08 PROCEDURE — 1159F MED LIST DOCD IN RCRD: CPT | Performed by: FAMILY MEDICINE

## 2025-04-08 PROCEDURE — 1160F RVW MEDS BY RX/DR IN RCRD: CPT | Performed by: FAMILY MEDICINE

## 2025-04-08 PROCEDURE — 1126F AMNT PAIN NOTED NONE PRSNT: CPT | Performed by: FAMILY MEDICINE

## 2025-04-08 PROCEDURE — G0439 PPPS, SUBSEQ VISIT: HCPCS | Performed by: FAMILY MEDICINE

## 2025-04-08 PROCEDURE — 1170F FXNL STATUS ASSESSED: CPT | Performed by: FAMILY MEDICINE

## 2025-04-08 RX ORDER — OMEPRAZOLE 40 MG/1
40 CAPSULE, DELAYED RELEASE ORAL
Qty: 90 CAPSULE | Refills: 3 | Status: SHIPPED | OUTPATIENT
Start: 2025-04-08

## 2025-04-08 NOTE — PROGRESS NOTES
Subjective   The ABCs of the Annual Wellness Visit  Medicare Wellness Visit      Suresh Alcantar is a 60 y.o. patient who presents for a Medicare Wellness Visit.    Doing fairly well today. Needs a refill but is otherwise doing well. No acute concerns.    The following portions of the patient's history were reviewed and   updated as appropriate: allergies, current medications, past family history, past medical history, past social history, past surgical history, and problem list.    Compared to one year ago, the patient's physical   health is the same.  Compared to one year ago, the patient's mental   health is the same.    Recent Hospitalizations:  He was not admitted to the hospital during the last year.     Current Medical Providers:  Patient Care Team:  Tavares De Oliveira MD as PCP - General (Family Medicine)    Outpatient Medications Prior to Visit   Medication Sig Dispense Refill    24HR Allergy Relief 180 MG tablet TAKE ONE TABLET BY MOUTH DAILY 90 tablet 0    acetaminophen (TYLENOL) 500 MG tablet Take 1 tablet by mouth Every 6 (Six) Hours As Needed for mild pain (1-3). 90 tablet 3    Afrin Nasal Spray 0.05 % nasal spray USE TWO SPRAY IN EACH NOSTRIL TWICE daily AS NEEDED FOR CONGESTION 30 mL 6    albuterol sulfate  (90 Base) MCG/ACT inhaler INHALE TWO PUFFS BY MOUTH EVERY 4 HOURS AS NEEDED for wheezing 18 g 2    baclofen (LIORESAL) 20 MG tablet Take 1 tablet by mouth 4 (Four) Times a Day. 120 tablet 6    benzonatate (TESSALON) 100 MG capsule Take 1 capsule by mouth.      calcium carbonate (TUMS) 500 MG chewable tablet CHEW ONE TABLET THREE TIMES DAILY 30 tablet 11    Cold Sore Products (Blistex ointment) ointment Apply 1 application topically 2 (Two) Times a Day As Needed (lip dryness). 10 g 11    guaiFENesin 200 MG tablet TAKE TWO TABLETS BY MOUTH EVERY 4 HOURS AS NEEDED COUGH 30 tablet 6    hydrocortisone 1 % cream APPLY ONE application TOPICALLY TO THE AFFECTED AREA AS directed TWICE DAILY  28 g 11    ibuprofen (ADVIL,MOTRIN) 200 MG tablet TAKE ONE TABLET BY MOUTH EVERY 8 HOURS AS NEEDED FOR mild PAIN 28 tablet 11    neomycin-bacitracin-polymyxin (NEOSPORIN) 5-400-5000 ointment Apply  topically to the appropriate area as directed 3 (Three) Times a Day As Needed for Irritation. 28 g 11    oxybutynin XL (DITROPAN-XL) 5 MG 24 hr tablet TAKE ONE TABLET BY MOUTH AT BEDTIME 63 tablet 6    Pediatric Multiple Vit-C-FA (multivitamin with C  & folic acid) with C & FA chewable tablet chewable tablet TAKE ONE TABLET BY MOUTH DAILY 36 tablet 11    Probiotic Product (Advanced Probiotic) capsule Take 1 capsule by mouth Daily. 265 capsule 2    docusate sodium (COLACE) 100 MG capsule TAKE ONE CAPSULE BY MOUTH TWICE DAILY 60 capsule 11    omeprazole (priLOSEC) 40 MG capsule TAKE ONE CAPSULE BY MOUTH AT BEDTIME 90 capsule 2    Pediatric Multiple Vitamins (Multivitamin Childrens) chewable tablet TAKE ONE TABLET DAILY (Patient not taking: Reported on 4/8/2025) 36 tablet 11    Saccharomyces boulardii (Probiotic) 250 MG capsule TAKE ONE CAPSULE BY MOUTH DAILY (Patient not taking: Reported on 4/8/2025) 265 capsule 11    azithromycin (Zithromax Z-Leonard) 250 MG tablet Take 2 tablets the first day, then 1 tablet daily for 4 days. (Patient not taking: Reported on 4/8/2025) 6 tablet 0     No facility-administered medications prior to visit.     No opioid medication identified on active medication list. I have reviewed chart for other potential  high risk medication/s and harmful drug interactions in the elderly.      Aspirin is not on active medication list.  Aspirin use is not indicated based on review of current medical condition/s. Risk of harm outweighs potential benefits.  .    Patient Active Problem List   Diagnosis    Spastic diplegic cerebral palsy    COVID-19 virus detected    Cerebral palsy    Dependence on wheelchair    Seasonal allergies    Gastroesophageal reflux disease without esophagitis     Advance Care Planning  "Advance Directive is on file.  ACP discussion was held with the patient during this visit. Patient has an advance directive in EMR which is still valid.       Objective   Vitals:    25 1127   BP: 132/92   Pulse: Comment: unable to get   Resp: 16   SpO2: Comment: unable to detect   Weight: 63.5 kg (140 lb)   Height: 175.3 cm (69\")   PainSc: 0-No pain       Estimated body mass index is 20.67 kg/m² as calculated from the following:    Height as of this encounter: 175.3 cm (69\").    Weight as of this encounter: 63.5 kg (140 lb).    BMI is within normal parameters. No other follow-up for BMI required.         Does the patient have evidence of cognitive impairment? Yes                                                                                  Health  Risk Assessment    Smoking Status:  Social History     Tobacco Use   Smoking Status Never   Smokeless Tobacco Never     Alcohol Consumption:  Social History     Substance and Sexual Activity   Alcohol Use No       Fall Risk Screen  STEADI Fall Risk Assessment was completed, and patient is at HIGH risk for falls. Assessment completed on:2025    Depression Screening   Little interest or pleasure in doing things? Not at all   Feeling down, depressed, or hopeless? Not at all   PHQ-2 Total Score 0      Health Habits and Functional and Cognitive Screenin/8/2025    11:34 AM   Functional & Cognitive Status   Do you have difficulty preparing food and eating? Yes   Do you have difficulty bathing yourself, getting dressed or grooming yourself? Yes   Do you have difficulty using the toilet? Yes   Do you have difficulty moving around from place to place? Yes   Do you have trouble with steps or getting out of a bed or a chair? Yes   Current Diet Well Balanced Diet   Dental Exam Up to date   Eye Exam Up to date   Exercise (times per week) 3 times per week   Current Exercises Include Walking;Other        Exercise Comment stretching   Do you need help using the phone? "  Yes   Are you deaf or do you have serious difficulty hearing?  No   Do you need help to go to places out of walking distance? Yes   Do you need help shopping? Yes   Do you need help preparing meals?  Yes   Do you need help with housework?  Yes   Do you need help with laundry? Yes   Do you need help taking your medications? Yes   Do you need help managing money? Yes   Do you ever drive or ride in a car without wearing a seat belt? No   Have you felt unusual stress, anger or loneliness in the last month? No   Who do you live with? Community   If you need help, do you have trouble finding someone available to you? No   Have you been bothered in the last four weeks by sexual problems? No   Do you have difficulty concentrating, remembering or making decisions? Yes         Age-appropriate Screening Schedule:  Refer to the list below for future screening recommendations based on patient's age, sex and/or medical conditions. Orders for these recommended tests are listed in the plan section. The patient has been provided with a written plan.    Health Maintenance List  Health Maintenance   Topic Date Due    ZOSTER VACCINE (1 of 2) Never done    Pneumococcal Vaccine 50+ (2 of 2 - PPSV23) 11/13/2016    COVID-19 Vaccine (6 - 2024-25 season) 09/01/2024    INFLUENZA VACCINE  07/01/2025    ANNUAL WELLNESS VISIT  04/08/2026    TDAP/TD VACCINES (2 - Td or Tdap) 12/13/2026    COLORECTAL CANCER SCREENING  12/13/2026    HEPATITIS C SCREENING  Completed                                                                                                                                                CMS Preventative Services Quick Reference  Risk Factors Identified During Encounter  Immunizations Discussed/Encouraged: Prevnar 20 (Pneumococcal 20-valent conjugate), Shingrix, and COVID19    The above risks/problems have been discussed with the patient.  Pertinent information has been shared with the patient in the After Visit Summary.  An  After Visit Summary and PPPS were made available to the patient.    Diagnoses and all orders for this visit:    1. Routine health maintenance (Primary)    2. Gastroesophageal reflux disease without esophagitis  -     omeprazole (priLOSEC) 40 MG capsule; Take 1 capsule by mouth every night at bedtime.  Dispense: 90 capsule; Refill: 3    3. Spastic diplegic cerebral palsy    Med as requested. No need for any interventions otherwise.    Recommended follow up as below. Encouraged communication via [employer] in the meantime.     Follow Up:   Next Medicare Wellness visit to be scheduled in 1 year.

## 2025-05-06 DIAGNOSIS — J30.2 SEASONAL ALLERGIES: ICD-10-CM

## 2025-05-06 RX ORDER — FEXOFENADINE HYDROCHLORIDE 180 MG/1
180 TABLET ORAL DAILY
Qty: 90 TABLET | Refills: 0 | Status: SHIPPED | OUTPATIENT
Start: 2025-05-06

## 2025-06-05 ENCOUNTER — TELEPHONE (OUTPATIENT)
Dept: FAMILY MEDICINE CLINIC | Facility: CLINIC | Age: 60
End: 2025-06-05
Payer: MEDICARE

## 2025-06-05 RX ORDER — PEDI MULTIVIT NO.25/FOLIC ACID 300 MCG
1 TABLET,CHEWABLE ORAL DAILY
Qty: 90 TABLET | Refills: 3 | Status: SHIPPED | OUTPATIENT
Start: 2025-06-05

## 2025-06-05 NOTE — TELEPHONE ENCOUNTER
Selected pediatric multivitamin to refill at pharmacy, with note to fill with whatever product is available at pharmacy. Please advise if appropriate to fill.    LAST REFILL - 05/29/24  LAST VISIT - 04/08/25  NEXT VISIT - not scheduled

## 2025-06-05 NOTE — TELEPHONE ENCOUNTER
Caller: NARESH    Relationship: Other [employer]     Best call back number:    [phone]       What medication are you requesting: MULTIVITAMIN-THE ONE LISTED SAYS NO LONGER AVAILABLE WHEN HUB TRIES TO CHOOSE IT    Have you had these symptoms before:    [x] Yes  [] No    Have you been treated for these symptoms before:   [x] Yes  [] No    If a prescription is needed, what is your preferred pharmacy and phone number: [facility] - [place], [place] - [address] [facility] [address] - [phone] [facility] [phone] FX

## 2025-06-20 DIAGNOSIS — J30.2 SEASONAL ALLERGIES: ICD-10-CM

## 2025-06-20 RX ORDER — MELATONIN 10 MG
1 TABLET, SUBLINGUAL SUBLINGUAL DAILY
Qty: 90 CAPSULE | Refills: 3 | Status: SHIPPED | OUTPATIENT
Start: 2025-06-20

## 2025-06-20 RX ORDER — GUAIFENESIN 200 MG/1
400 TABLET ORAL EVERY 4 HOURS PRN
Qty: 30 TABLET | Refills: 6 | Status: SHIPPED | OUTPATIENT
Start: 2025-06-20

## 2025-06-20 RX ORDER — OXYMETAZOLINE HYDROCHLORIDE 0.05 G/100ML
2 SPRAY NASAL 2 TIMES DAILY PRN
Qty: 30 ML | Refills: 6 | Status: SHIPPED | OUTPATIENT
Start: 2025-06-20

## 2025-06-20 NOTE — TELEPHONE ENCOUNTER
Refill request received via fax from patient's [facility] for guaifenesin, probiotic, afrin refill.    LAST REFILL - 06/14/24  LAST VISIT - 04/08/25  NEXT VISIT - not scheduled

## 2025-06-27 ENCOUNTER — TELEPHONE (OUTPATIENT)
Dept: FAMILY MEDICINE CLINIC | Facility: CLINIC | Age: 60
End: 2025-06-27
Payer: MEDICARE

## 2025-06-27 NOTE — TELEPHONE ENCOUNTER
Hua,  for patient's that takes care of scheduling, ordering supplies, and general care for the patient, called our office to request information about ordering incontinent supplies for the patient. He was told by [facility], who delivers these supplies, that they were unable to do so because of being told by the doctor's office that the doctor would not sign for these documents without a referral placed by the prescribing doctor's office. He states that he spoke with Lucas at [facility], phone [phone], fax [phone], that gave him this information.    Contacted [facility], spoke with Priyanka. She verified the previous information was correct. She states the office contact for the prescribing doctor's office was Aura. Verified with Priyanka that this is not a person who works in this office. Requested she resume ordering as previous, send fax to our office for us to sign as done prior. She states she will reach out to patient's POA to confirm supply order and send to our office accordingly.     Hua informed of this information in follow-up phone call. He verbalized an understanding.   Transitional Care Management Telephone Call Attempt    Discharge Date: 5/30/25  Discharge Location: Located within [facility]: [facility]    Call Attempt Date: 6/3/2025  Call Attempt: First

## 2025-07-01 RX ORDER — OXYBUTYNIN CHLORIDE 5 MG/1
5 TABLET, EXTENDED RELEASE ORAL
Qty: 90 TABLET | Refills: 3 | Status: SHIPPED | OUTPATIENT
Start: 2025-07-01

## 2025-07-01 NOTE — TELEPHONE ENCOUNTER
Refill request received via fax from patient's [facility] for oxybutynin refill.    LAST REFILL - 06/11/24  LAST VISIT - 04/08/25  NEXT VISIT - not scheduled    Routing to covering provider per PCP being out of office. Please advise.

## 2025-07-09 ENCOUNTER — TELEPHONE (OUTPATIENT)
Dept: FAMILY MEDICINE CLINIC | Facility: CLINIC | Age: 60
End: 2025-07-09
Payer: MEDICARE

## 2025-07-09 NOTE — TELEPHONE ENCOUNTER
Caller: DAWIT WITH [facility]    Relationship to patient: OTHER     Best call back number:     Patient is needing: DAWIT WOULD LIKE A CALLBACK WITH CONFIRMATION THAT THE OFFICE RECEIVED THEIR FAX ON 7/2/25 FOR INCONTINENCE SUPPLIES, AND WHAT THE STATUS OF TURNAROUND IS FOR THAT ORDER.

## 2025-07-09 NOTE — TELEPHONE ENCOUNTER
Returned call, clarified fax was received but we are awaiting physician's signature when he returns to office. They verbalized an understanding and will document appropriately.

## 2025-07-24 DIAGNOSIS — J30.2 SEASONAL ALLERGIES: ICD-10-CM

## 2025-07-24 RX ORDER — FEXOFENADINE HYDROCHLORIDE 180 MG/1
180 TABLET ORAL DAILY
Qty: 90 TABLET | Refills: 0 | Status: SHIPPED | OUTPATIENT
Start: 2025-07-24

## 2025-08-22 RX ORDER — BACLOFEN 20 MG/1
20 TABLET ORAL 4 TIMES DAILY
Qty: 120 TABLET | Refills: 6 | Status: SHIPPED | OUTPATIENT
Start: 2025-08-22